# Patient Record
Sex: MALE | Race: WHITE | NOT HISPANIC OR LATINO | Employment: OTHER | URBAN - METROPOLITAN AREA
[De-identification: names, ages, dates, MRNs, and addresses within clinical notes are randomized per-mention and may not be internally consistent; named-entity substitution may affect disease eponyms.]

---

## 2018-10-25 ENCOUNTER — OFFICE VISIT - HEALTHEAST (OUTPATIENT)
Dept: UROLOGY | Facility: CLINIC | Age: 68
End: 2018-10-25

## 2018-10-25 DIAGNOSIS — N20.1 CALCULUS OF URETER: ICD-10-CM

## 2018-10-25 DIAGNOSIS — N13.2 HYDRONEPHROSIS WITH URINARY OBSTRUCTION DUE TO URETERAL CALCULUS: ICD-10-CM

## 2018-10-25 DIAGNOSIS — N20.0 CALCULUS OF KIDNEY: ICD-10-CM

## 2018-10-25 LAB
ALBUMIN UR-MCNC: NEGATIVE MG/DL
APPEARANCE UR: CLEAR
BILIRUB UR QL STRIP: NEGATIVE
COLOR UR AUTO: YELLOW
GLUCOSE UR STRIP-MCNC: NEGATIVE MG/DL
HGB UR QL STRIP: ABNORMAL
KETONES UR STRIP-MCNC: NEGATIVE MG/DL
LEUKOCYTE ESTERASE UR QL STRIP: NEGATIVE
NITRATE UR QL: NEGATIVE
PH UR STRIP: 6 [PH] (ref 5–8)
SP GR UR STRIP: 1.02 (ref 1–1.03)
UROBILINOGEN UR STRIP-ACNC: ABNORMAL

## 2018-10-25 RX ORDER — CELECOXIB 200 MG/1
200 CAPSULE ORAL
Status: SHIPPED | COMMUNITY
Start: 2017-12-11

## 2018-10-25 RX ORDER — ATORVASTATIN CALCIUM 20 MG/1
20 TABLET, FILM COATED ORAL
Status: SHIPPED | COMMUNITY
Start: 2017-12-14 | End: 2021-12-23

## 2018-11-08 ENCOUNTER — OFFICE VISIT - HEALTHEAST (OUTPATIENT)
Dept: UROLOGY | Facility: CLINIC | Age: 68
End: 2018-11-08

## 2018-11-08 ENCOUNTER — HOSPITAL ENCOUNTER (OUTPATIENT)
Dept: CT IMAGING | Facility: CLINIC | Age: 68
Discharge: HOME | End: 2018-11-08
Attending: PHYSICIAN ASSISTANT

## 2018-11-08 DIAGNOSIS — N20.9 URINARY TRACT STONES: ICD-10-CM

## 2018-11-08 DIAGNOSIS — N13.2 HYDRONEPHROSIS WITH URINARY OBSTRUCTION DUE TO URETERAL CALCULUS: ICD-10-CM

## 2018-11-08 DIAGNOSIS — N20.1 CALCULUS OF URETER: ICD-10-CM

## 2018-11-08 DIAGNOSIS — N20.0 CALCULUS OF KIDNEY: ICD-10-CM

## 2018-11-08 LAB
ALBUMIN UR-MCNC: NEGATIVE MG/DL
APPEARANCE UR: CLEAR
BILIRUB UR QL STRIP: NEGATIVE
COLOR UR AUTO: YELLOW
GLUCOSE UR STRIP-MCNC: NEGATIVE MG/DL
HGB UR QL STRIP: NEGATIVE
KETONES UR STRIP-MCNC: NEGATIVE MG/DL
LEUKOCYTE ESTERASE UR QL STRIP: NEGATIVE
NITRATE UR QL: NEGATIVE
PH UR STRIP: 6 [PH] (ref 5–8)
SP GR UR STRIP: 1.01 (ref 1–1.03)
UROBILINOGEN UR STRIP-ACNC: NORMAL

## 2018-11-21 ENCOUNTER — HOSPITAL ENCOUNTER (OUTPATIENT)
Dept: CT IMAGING | Facility: CLINIC | Age: 68
Discharge: HOME | End: 2018-11-21
Attending: PHYSICIAN ASSISTANT

## 2018-11-21 ENCOUNTER — OFFICE VISIT - HEALTHEAST (OUTPATIENT)
Dept: UROLOGY | Facility: CLINIC | Age: 68
End: 2018-11-21

## 2018-11-21 DIAGNOSIS — N20.1 CALCULUS OF URETER: ICD-10-CM

## 2018-11-21 DIAGNOSIS — N20.0 CALCULUS OF KIDNEY: ICD-10-CM

## 2018-11-21 LAB
ALBUMIN UR-MCNC: NEGATIVE MG/DL
APPEARANCE UR: CLEAR
BILIRUB UR QL STRIP: NEGATIVE
COLOR UR AUTO: YELLOW
GLUCOSE UR STRIP-MCNC: NEGATIVE MG/DL
HGB UR QL STRIP: NEGATIVE
KETONES UR STRIP-MCNC: NEGATIVE MG/DL
LEUKOCYTE ESTERASE UR QL STRIP: NEGATIVE
NITRATE UR QL: NEGATIVE
PH UR STRIP: 5 [PH] (ref 5–8)
SP GR UR STRIP: 1.01 (ref 1–1.03)
UROBILINOGEN UR STRIP-ACNC: NORMAL

## 2018-11-21 RX ORDER — ACETAMINOPHEN 500 MG
500 TABLET ORAL EVERY 6 HOURS PRN
Status: SHIPPED | COMMUNITY
Start: 2018-11-21 | End: 2021-11-17

## 2018-12-12 ENCOUNTER — OFFICE VISIT - HEALTHEAST (OUTPATIENT)
Dept: UROLOGY | Facility: CLINIC | Age: 68
End: 2018-12-12

## 2018-12-12 ENCOUNTER — HOSPITAL ENCOUNTER (OUTPATIENT)
Dept: CT IMAGING | Facility: CLINIC | Age: 68
Discharge: HOME | End: 2018-12-12
Attending: UROLOGY

## 2018-12-12 DIAGNOSIS — N20.1 CALCULUS OF URETER: ICD-10-CM

## 2018-12-12 DIAGNOSIS — N20.0 CALCULUS OF KIDNEY: ICD-10-CM

## 2018-12-12 LAB
ALBUMIN UR-MCNC: NEGATIVE MG/DL
APPEARANCE UR: CLEAR
BILIRUB UR QL STRIP: NEGATIVE
COLOR UR AUTO: YELLOW
GLUCOSE UR STRIP-MCNC: NEGATIVE MG/DL
HGB UR QL STRIP: ABNORMAL
KETONES UR STRIP-MCNC: NEGATIVE MG/DL
LEUKOCYTE ESTERASE UR QL STRIP: NEGATIVE
NITRATE UR QL: NEGATIVE
PH UR STRIP: 5.5 [PH] (ref 5–8)
SP GR UR STRIP: >=1.03 (ref 1–1.03)
UROBILINOGEN UR STRIP-ACNC: ABNORMAL

## 2018-12-12 RX ORDER — TAMSULOSIN HYDROCHLORIDE 0.4 MG/1
0.4 CAPSULE ORAL DAILY
Qty: 90 CAPSULE | Refills: 3 | Status: SHIPPED | OUTPATIENT
Start: 2018-12-12

## 2019-01-02 ENCOUNTER — AMBULATORY - HEALTHEAST (OUTPATIENT)
Dept: UROLOGY | Facility: CLINIC | Age: 69
End: 2019-01-02

## 2019-01-03 ENCOUNTER — SURGERY - HEALTHEAST (OUTPATIENT)
Dept: UROLOGY | Facility: CLINIC | Age: 69
End: 2019-01-03

## 2019-01-04 ENCOUNTER — SURGERY - HEALTHEAST (OUTPATIENT)
Dept: SURGERY | Facility: CLINIC | Age: 69
End: 2019-01-04

## 2019-01-04 ENCOUNTER — ANESTHESIA - HEALTHEAST (OUTPATIENT)
Dept: SURGERY | Facility: CLINIC | Age: 69
End: 2019-01-04

## 2019-01-04 ASSESSMENT — MIFFLIN-ST. JEOR: SCORE: 1571.1

## 2019-01-09 ENCOUNTER — AMBULATORY - HEALTHEAST (OUTPATIENT)
Dept: UROLOGY | Facility: CLINIC | Age: 69
End: 2019-01-09

## 2019-01-09 ENCOUNTER — RECORDS - HEALTHEAST (OUTPATIENT)
Dept: ADMINISTRATIVE | Facility: OTHER | Age: 69
End: 2019-01-09

## 2019-01-09 DIAGNOSIS — N20.1 CALCULUS OF URETER: ICD-10-CM

## 2019-01-09 DIAGNOSIS — N20.0 CALCULUS OF KIDNEY: ICD-10-CM

## 2019-01-10 LAB — BACTERIA SPEC CULT: NO GROWTH

## 2019-02-13 ENCOUNTER — OFFICE VISIT - HEALTHEAST (OUTPATIENT)
Dept: UROLOGY | Facility: CLINIC | Age: 69
End: 2019-02-13

## 2019-02-13 ENCOUNTER — HOSPITAL ENCOUNTER (OUTPATIENT)
Dept: CT IMAGING | Facility: CLINIC | Age: 69
Discharge: HOME | End: 2019-02-13
Attending: UROLOGY

## 2019-02-13 DIAGNOSIS — N20.1 CALCULUS OF URETER: ICD-10-CM

## 2019-02-13 LAB
ALBUMIN UR-MCNC: NEGATIVE MG/DL
APPEARANCE UR: CLEAR
BILIRUB UR QL STRIP: NEGATIVE
COLOR UR AUTO: YELLOW
GLUCOSE UR STRIP-MCNC: NEGATIVE MG/DL
HGB UR QL STRIP: NEGATIVE
KETONES UR STRIP-MCNC: ABNORMAL MG/DL
LEUKOCYTE ESTERASE UR QL STRIP: NEGATIVE
NITRATE UR QL: NEGATIVE
PH UR STRIP: 5.5 [PH] (ref 5–8)
SP GR UR STRIP: 1.02 (ref 1–1.03)
UROBILINOGEN UR STRIP-ACNC: ABNORMAL

## 2021-06-02 VITALS — HEIGHT: 70 IN | WEIGHT: 177.44 LBS | BODY MASS INDEX: 25.4 KG/M2

## 2021-06-02 VITALS — WEIGHT: 178 LBS

## 2021-06-21 NOTE — PROGRESS NOTES
Assessment/Plan:        Diagnoses and all orders for this visit:    Calculus of kidney  -     Urinalysis Macroscopic    Calculus of ureter  -     CT Abdomen Pelvis Without Oral Without IV Contrast; Future; Expected date: 12/05/2018    Other orders  -     IBUPROFEN ORAL; Take 200 mg by mouth.  -     acetaminophen (TYLENOL) 500 MG tablet; Take 500 mg by mouth every 6 (six) hours as needed for pain.  -     Symptom Control While Passing a Stone Education  -     Patient Stated Goal: Pass my stone      Stone Management Plan  [facility] 10/25/2018 11/8/2018 11/21/2018   Urinary Tract Infection No suspicion of infection No suspicion of infection No suspicion of infection   Renal Colic Well controlled symptoms Asymptomatic at this time Asymptomatic at this time   Renal Failure No suspicion of renal failure No suspicion of renal failure No suspicion of renal failure   Current CT date 10/23/2018 11/8/2018 11/21/2018   Right sided stones? No No No   R Stone Event No current event No current event No current event   Left sided stones? Yes Yes Yes   L Number of ureteral stones 3 3 3   L GSD of ureteral stones 6 5 5   L Location of ureteral stone Distal Distal Distal   L Number of kidney stones  5 Renal stones unchanged from last exam Renal stones unchanged from last exam   L GSD of kidney stones 2 - 4 2 - 4 2 - 4   L Hydronephrosis Mild Mild Mild   L Stone Event New event Established event Established event   Diagnosis date 10/23/2018 - -   Initial location of primary symptomatic stone Distal - -   Initial GSD of primary symptomatic stone 6 - -   L MET Status Initiation Progression Progression   L Current Plan MET MET MET   MET 2 week F/U 2 week F/U 2 week F/U             Subjective:      HPI  Mr. Maxi Hoffman is a 68 y.o.  male returning to the [facility] [place] for medical expulsive therapy follow up.     On last encounter, his 5 mm stone was in left distal ureter with mild hydronephrosis.  He has had no unanticipated events.    Symptoms have been minimal . He is asymptomatic at present. He denies symptoms of fever, chills, flank pain, nausea, vomiting, urinary frequency and dysuria. Has not seen stone pass.    New CT scan was personally reviewed and demonstrates progression of one of the stones to the bladder, largest 5.5 mm stone remains in the distal ureter with stable mild hydronephrosis.     He will continue to attempt to pass stone and will return in 3 weeks with further imaging. .    If no passage by then will plan for ureteroscopy     ROS   A 12 point comprehensive review of systems is negative except for HPI.    Past Medical History:   Diagnosis Date     Arthritis      High cholesterol      Kidney stone        No past surgical history on file.    Current Outpatient Medications   Medication Sig Dispense Refill     acetaminophen (TYLENOL) 500 MG tablet Take 500 mg by mouth every 6 (six) hours as needed for pain.       atorvastatin (LIPITOR) 20 MG tablet Take 20 mg by mouth.       celecoxib (CELEBREX) 200 MG capsule Take 200 mg by mouth.       IBUPROFEN ORAL Take 200 mg by mouth.       No current facility-administered medications for this visit.        No Known Allergies    Social History     Socioeconomic History     Marital status:      Spouse name: Not on file     Number of children: Not on file     Years of education: Not on file     Highest education level: Not on file   Social Needs     Financial resource strain: Not on file     Food insecurity - worry: Not on file     Food insecurity - inability: Not on file     Transportation needs - medical: Not on file     Transportation needs - non-medical: Not on file   Occupational History     Not on file   Tobacco Use     Smoking status: Never Smoker   Substance and Sexual Activity     Alcohol use: No     Drug use: Not on file     Sexual activity: Not on file   Other Topics Concern     Not on file   Social History Narrative     Not on file        Family History   Problem Relation Age of Onset     Cancer Mother         breast     Diabetes Father      Arthritis Father      Urolithiasis Brother      Objective:      Physical Exam  Vitals:    11/21/18 1127   BP: 159/85   Pulse: 66   Temp: 97.8  F (36.6  C)     General - well developed, well nourished, appropriate for age. Appears no distress at this time  Abdomen - slender soft, non-tender, no hepatosplenomegaly, no masses.   - no flank tenderness, no suprapubic tenderness, kidney and bladder non-palpable  MSK - normal spinal curvature. no spinal tenderness. normal gait. muscular strength intact.  Psych - oriented to time, place, and person, normal mood and affect.      Labs   Urinalysis POC (Office):  Nitrite, UA   Date Value Ref Range Status   11/21/2018 Negative Negative Final   11/08/2018 Negative Negative Final   10/25/2018 Negative Negative Final       Lab Urinalysis:  Blood, UA   Date Value Ref Range Status   11/21/2018 Negative Negative Final   11/08/2018 Negative Negative Final   10/25/2018 Trace (!) Negative Final     Nitrite, UA   Date Value Ref Range Status   11/21/2018 Negative Negative Final   11/08/2018 Negative Negative Final   10/25/2018 Negative Negative Final     Leukocytes, UA   Date Value Ref Range Status   11/21/2018 Negative Negative Final   11/08/2018 Negative Negative Final   10/25/2018 Negative Negative Final     pH, UA   Date Value Ref Range Status   11/21/2018 5.0 5.0 - 8.0 Final   11/08/2018 6.0 5.0 - 8.0 Final   10/25/2018 6.0 5.0 - 8.0 Final

## 2021-06-21 NOTE — PROGRESS NOTES
Assessment/Plan:        Diagnoses and all orders for this visit:    Calculus of ureter  -     Urinalysis Macroscopic  -     Symptom Control While Passing a Stone Education  -     CT Abdomen Pelvis Without Oral Without IV Contrast; Future; Expected date: 11/8/18  -     Patient Stated Goal: Pass my stone    Calculus of kidney    Hydronephrosis with urinary obstruction due to ureteral calculus    Other orders  -     celecoxib (CELEBREX) 200 MG capsule; Take 200 mg by mouth.  -     atorvastatin (LIPITOR) 20 MG tablet; Take 20 mg by mouth.      Stone Management Plan  [facility] 10/25/2018   Urinary Tract Infection No suspicion of infection   Renal Colic Well controlled symptoms   Renal Failure No suspicion of renal failure   Current CT date 10/23/2018   Right sided stones? No   R Stone Event No current event   Left sided stones? Yes   L Number of ureteral stones 3   L GSD of ureteral stones 6   L Location of ureteral stone Distal   L Number of kidney stones  5   L GSD of kidney stones 2 - 4   L Hydronephrosis Mild   L Stone Event New event   Diagnosis date 10/23/2018   Initial location of primary symptomatic stone Distal   Initial GSD of primary symptomatic stone 6   L MET Status Initiation   L Current Plan MET           Subjective:      HPI  Mr. Maxi Hoffman is a 68 y.o.  male presenting to the [facility] [place] following [facility] ED visit for urolithiasis.    He is a remotely recurrent unidentified composition stone former who has not required stone clearance procedures. He has not previously participated in stone risk evaluation. He has no identified modifiable stone risk factors. He has identified non-modifiable stone risks including:  multiple stones at presentation.    Primary symptom at presentation was acute onset left flank pain x several hours. The pain was constant and fluctuant. He found no relief with applying heat to the area. At its worst, pain reached 10/10, improving with  pressure to back. He has remote history of stone event ~ 20 years ago. He has never seen a Urologist. Significant associated symptoms at presentation included:  nausea, vomiting and weakness. Evaluation in ED 10/23 was notable for CT reporting moderate left hydronephrosis with distal ureteral calculi x 2 and ipsilateral tiny renal stones. He was sent home with medications and strainer.    He currently rates 2/10 left flank pain. He has not observed stone (s) to pass. He denies symptoms of fever, chills, nausea, vomiting, urinary frequency and dysuria.     CT scan from 10/23/18 is personally reviewed and demonstrates multiple left distal ureteral stones with mild hydronephrosis (6 mm UVJ, 1 mm and 3 mm adjacent distal). There are ~ 5 left renal stones within upper and mid pole, largest 3 mm. No right sided stones.    Significant labs from presentation include mild hematuria, mild pyuria, negative nitrite, few bacteria and no growth on urine culture.    PLAN    67 yo M with remote history of stone disease, no previous surgical stone interventions. Acute left flank pain with obstructing left distal ureteral stones x 3. Multiple, small non-obstructing left renal stones.    Will proceed with trial of passage. Risks and benefits were detailed of medical expulsive therapy including probability of stone passage, recurrent renal colic, and requirement of emergency medical and/or surgical care and further imaging. He will return in 2 weeks with imaging.    For symptom control, he was prescribed oxycodone and ondansetron. Over the counter symptom control medications of ibuprofen, Dramamine and Tylenol were recommended.    Patient also seen and examined by JOSE L Cobb   Review of Systems  A 12 point comprehensive review of systems is negative except for HPI    Past Medical History:   Diagnosis Date     Arthritis      High cholesterol      Kidney stone        No past surgical history on file.    Current  Outpatient Prescriptions   Medication Sig Dispense Refill     acetaminophen (TYLENOL) 500 MG tablet Take 2 tablets (1,000 mg total) by mouth 4 (four) times a day for 7 days. 56 tablet 0     atorvastatin (LIPITOR) 20 MG tablet Take 20 mg by mouth.       celecoxib (CELEBREX) 200 MG capsule Take 200 mg by mouth.       dimenhyDRINATE (DRAMAMINE) 50 MG tablet Take 1 tablet (50 mg total) by mouth at bedtime for 7 days. 7 tablet 0     ibuprofen (ADVIL,MOTRIN) 200 MG tablet Take 2 tablets (400 mg total) by mouth 4 (four) times a day for 7 days. 56 tablet 0     oxyCODONE (ROXICODONE) 5 MG immediate release tablet Every 4-6 hours as needed if pain is not improved with acetaminophen and ibuprofen. 20 tablet 0     dimenhyDRINATE (DRAMAMINE) 50 MG tablet Take 1 tablet (50 mg total) by mouth 4 (four) times a day as needed. 28 tablet 0     ondansetron (ZOFRAN ODT) 4 MG disintegrating tablet Take 1 tablet (4 mg total) by mouth every 8 (eight) hours as needed for nausea. 10 tablet 0     No current facility-administered medications for this visit.        No Known Allergies    Social History     Social History     Marital status:      Spouse name: N/A     Number of children: N/A     Years of education: N/A     Occupational History     Not on file.     Social History Main Topics     Smoking status: Never Smoker     Smokeless tobacco: Not on file     Alcohol use No     Drug use: Not on file     Sexual activity: Not on file     Other Topics Concern     Not on file     Social History Narrative       Family History   Problem Relation Age of Onset     Cancer Mother      breast     Diabetes Father      Arthritis Father      Urolithiasis Brother        Objective:      Physical Exam  Vitals:    10/25/18 1131   BP: 173/80   Pulse: 60   Temp: 97.7  F (36.5  C)     General - well developed, well nourished, appropriate for age. Appears no distress at this time   Heart - regular rate and rhythm, no murmur  Respiratory - normal effort, clear  to auscultation, good air entry without adventitious noises  Abdomen - slender soft, non-tender, no hepatosplenomegaly, no masses.   - no flank tenderness, no suprapubic tenderness, kidney and bladder non-palpable  MSK - normal spinal curvature. no spinal tenderness. normal gait. muscular strength intact.  Neurology - cranial nerves II-XII grossly intact, normal sensation, no unsteadiness  Skin - intact, no bruising, no gouty tophi  Psych - oriented to time, place, and person, normal mood and affect.    Labs  Urinalysis POC (Office):  Nitrite, UA   Date Value Ref Range Status   10/25/2018 Negative Negative Final   10/23/2018 Negative Negative Final       Lab Urinalysis:  Blood, UA   Date Value Ref Range Status   10/25/2018 Trace (!) Negative Final   10/23/2018 Small (!) Negative Final     Nitrite, UA   Date Value Ref Range Status   10/25/2018 Negative Negative Final   10/23/2018 Negative Negative Final     Leukocytes, UA   Date Value Ref Range Status   10/25/2018 Negative Negative Final   10/23/2018 Large (!) Negative Final     pH, UA   Date Value Ref Range Status   10/25/2018 6.0 5.0 - 8.0 Final   10/23/2018 6.0 4.5 - 8.0 Final    and Acute Labs   Urine Culture    Culture   Date Value Ref Range Status   10/23/2018 No Growth  Final

## 2021-06-21 NOTE — PROGRESS NOTES
Assessment/Plan:        Diagnoses and all orders for this visit:    Calculus of ureter  -     Symptom Control While Passing a Stone Education  -     CT Abdomen Pelvis Without Oral Without IV Contrast; Future; Expected date: 11/22/18  -     Patient Stated Goal: Pass my stone    Urinary tract stones  -     Urinalysis Macroscopic    Calculus of kidney    Hydronephrosis with urinary obstruction due to ureteral calculus      Stone Management Plan  [facility] 10/25/2018 11/8/2018   Urinary Tract Infection No suspicion of infection No suspicion of infection   Renal Colic Well controlled symptoms Asymptomatic at this time   Renal Failure No suspicion of renal failure No suspicion of renal failure   Current CT date 10/23/2018 11/8/2018   Right sided stones? No No   R Stone Event No current event No current event   Left sided stones? Yes Yes   L Number of ureteral stones 3 3   L GSD of ureteral stones 6 5   L Location of ureteral stone Distal Distal   L Number of kidney stones  5 Renal stones unchanged from last exam   L GSD of kidney stones 2 - 4 2 - 4   L Hydronephrosis Mild Mild   L Stone Event New event Established event   Diagnosis date 10/23/2018 -   Initial location of primary symptomatic stone Distal -   Initial GSD of primary symptomatic stone 6 -   L MET Status Initiation Progression   L Current Plan MET MET   MET 2 week F/U 2 week F/U         Subjective:      HPI  Mr. Maxi Hoffman is a 68 y.o.  male returning to the [facility] [place] for medical expulsive therapy follow up.     On last encounter, he had multiple left distal ureteral stones (6, 1 and 3 mm) with mild hydronephrosis. He has had no unanticipated events.    Symptoms have been minimal. He has used ibuprofen and tylenol only which have been helpful. He is asymptomatic at present. He denies symptoms of fever, chills, flank pain, nausea, vomiting, urinary frequency and dysuria.     New CT scan was personally reviewed  and demonstrates progression of stones, now all sitting adjacent to one another within left distal ureter. Mild hydronephrosis. No change to small left renal stones.    PLAN    69 yo M with remote history of stone disease, no previous surgical stone interventions.  Further distal migration of obstructing left distal ureteral stones x 3. Multiple, small non-obstructing left renal stones.    He will continue to attempt to pass stone and will return in 2 weeks with further imaging.     Patient also seen and examined by Irma Jaramillo PA-C       ROS   Review of systems is negative except for HPI.    Past Medical History:   Diagnosis Date     Arthritis      High cholesterol      Kidney stone        History reviewed. No pertinent surgical history.    Current Outpatient Prescriptions   Medication Sig Dispense Refill     atorvastatin (LIPITOR) 20 MG tablet Take 20 mg by mouth.       celecoxib (CELEBREX) 200 MG capsule Take 200 mg by mouth.       No current facility-administered medications for this visit.        No Known Allergies    Social History     Social History     Marital status:      Spouse name: N/A     Number of children: N/A     Years of education: N/A     Occupational History     Not on file.     Social History Main Topics     Smoking status: Never Smoker     Smokeless tobacco: Not on file     Alcohol use No     Drug use: Not on file     Sexual activity: Not on file     Other Topics Concern     Not on file     Social History Narrative       Family History   Problem Relation Age of Onset     Cancer Mother      breast     Diabetes Father      Arthritis Father      Urolithiasis Brother      Objective:      Physical Exam  Vitals:    11/08/18 1148   BP: (!) 198/96   Pulse: 71   Temp: 97.9  F (36.6  C)     General - well developed, well nourished, appropriate for age. Appears no distress at this time  Abdomen - slender soft, non-tender, no hepatosplenomegaly, no masses.   - no flank tenderness, no suprapubic  tenderness, kidney and bladder non-palpable  MSK - normal spinal curvature. no spinal tenderness. normal gait. muscular strength intact.  Psych - oriented to time, place, and person, normal mood and affect.    Labs   Urinalysis POC (Office):  Nitrite, UA   Date Value Ref Range Status   11/08/2018 Negative Negative Final   10/25/2018 Negative Negative Final   10/23/2018 Negative Negative Final       Lab Urinalysis:  Blood, UA   Date Value Ref Range Status   11/08/2018 Negative Negative Final   10/25/2018 Trace (!) Negative Final   10/23/2018 Small (!) Negative Final     Nitrite, UA   Date Value Ref Range Status   11/08/2018 Negative Negative Final   10/25/2018 Negative Negative Final   10/23/2018 Negative Negative Final     Leukocytes, UA   Date Value Ref Range Status   11/08/2018 Negative Negative Final   10/25/2018 Negative Negative Final   10/23/2018 Large (!) Negative Final     pH, UA   Date Value Ref Range Status   11/08/2018 6.0 5.0 - 8.0 Final   10/25/2018 6.0 5.0 - 8.0 Final   10/23/2018 6.0 4.5 - 8.0 Final

## 2021-06-22 NOTE — ANESTHESIA CARE TRANSFER NOTE
Last vitals:   Vitals:    01/04/19 0958   BP: 124/70   Pulse: 81   Resp: 16   Temp: 36.3  C (97.4  F)   SpO2: 100%     Patient's level of consciousness is drowsy  Spontaneous respirations: yes  Maintains airway independently: yes  Dentition unchanged: yes  Oropharynx: oropharynx clear of all foreign objects    QCDR Measures:  ASA# 20 - Surgical Safety Checklist: WHO surgical safety checklist completed prior to induction    PQRS# 430 - Adult PONV Prevention: 4558F - Pt received => 2 anti-emetic agents (different classes) preop & intraop  ASA# 8 - Peds PONV Prevention: NA - Not pediatric patient, not GA or 2 or more risk factors NOT present  PQRS# 424 - Kelly-op Temp Management: 4559F - At least one body temp DOCUMENTED => 35.5C or 95.9F within required timeframe  PQRS# 426 - PACU Transfer Protocol:NA - Patient did not go to PACU  ASA# 14 - Acute Post-op Pain: NA - Patient under age 10y or did not go to PACU

## 2021-06-22 NOTE — ANESTHESIA POSTPROCEDURE EVALUATION
Patient: Maxi Hoffman  CYSTOSCOPY, LEFT URETEROSCOPY LASER LITHOTRIPSY STENT INSERTION  Anesthesia type: general    Patient location: PACU  Last vitals:   Vitals:    01/04/19 1015   BP: 117/65   Pulse: 76   Resp: 17   Temp:    SpO2: 96%     Post vital signs: stable  Level of consciousness: awake and responds to simple questions  Post-anesthesia pain: pain controlled  Post-anesthesia nausea and vomiting: no  Pulmonary: unassisted, return to baseline  Cardiovascular: stable and blood pressure at baseline  Hydration: adequate  Anesthetic events: no    QCDR Measures:  ASA# 11 - Kelly-op Cardiac Arrest: ASA11B - Patient did NOT experience unanticipated cardiac arrest  ASA# 12 - Kelly-op Mortality Rate: ASA12B - Patient did NOT die  ASA# 13 - PACU Re-Intubation Rate: ASA13B - Patient did NOT require a new airway mgmt  ASA# 10 - Composite Anes Safety: ASA10A - No serious adverse event    Additional Notes:

## 2021-06-22 NOTE — PROGRESS NOTES
Assessment/Plan:        Diagnoses and all orders for this visit:    Calculus of kidney  -     Urinalysis Macroscopic  -     XR Abdomen AP; Future; Expected date: 12/12/2018  -     tamsulosin (FLOMAX) 0.4 mg cap; Take 1 capsule (0.4 mg total) by mouth daily.  Dispense: 90 capsule; Refill: 3  -     Stone Analysis- Standing; Standing    Calculus of ureter  -     Symptom Control While Passing a Stone Education  -     Patient Stated Goal: Pass my stone      Stone Management Plan  [facility] 11/8/2018 11/21/2018 12/17/2018   Urinary Tract Infection No suspicion of infection No suspicion of infection No suspicion of infection   Renal Colic Asymptomatic at this time Asymptomatic at this time Asymptomatic at this time   Renal Failure No suspicion of renal failure No suspicion of renal failure No suspicion of renal failure   Current CT date 11/8/2018 11/21/2018 12/12/2018   Right sided stones? No No No   R Stone Event No current event No current event No current event   Left sided stones? Yes Yes Yes   L Number of ureteral stones 3 2 1   L GSD of ureteral stones 5 5 5   L Location of ureteral stone Distal Distal Distal   L Number of kidney stones  Renal stones unchanged from last exam Renal stones unchanged from last exam Renal stones unchanged from last exam   L GSD of kidney stones 2 - 4 2 - 4 2 - 4   L Hydronephrosis Mild Mild Mild   L Stone Event Established event Established event Established event   Diagnosis date - - -   Initial location of primary symptomatic stone - - -   Initial GSD of primary symptomatic stone - - -   L MET Status Progression Progression Progression   L Current Plan MET MET MET   MET 2 week F/U 2 week F/U (No Data)             Subjective:      HPI  Mr. Maxi Hoffman is a 68 y.o.  male returning to the [facility] [place] for medical expulsive therapy follow up.     On last encounter, his 5 mm stone was in left distal ureter with mild hydronephrosis. He has had no  unanticipated events.    Symptoms have been well controlled with medication and he is able to carry on normal activities. Significant current symptoms include:  left flank pain. Pertinent negative current symptoms include:  fever, chills, right flank pain, nausea, vomiting, hematuria, urinary frequency and dysuria. Had one episode of discomofrt otherwise comfortable and unaware stone is still present.  Was started on flomax as he had BPH symptoms as well and has noticed improvement in LUTS.    New CT scan was personally reviewed and demonstrates progression of stone to extreme left UVJ with stable mild hydronephrosis.     He will continue to attempt to pass stone and will return in 4 weeks weeks with further imaging. .       ROS   A 12 point comprehensive review of systems is negative except for HPI.    Past Medical History:   Diagnosis Date     Arthritis      High cholesterol      Kidney stone        History reviewed. No pertinent surgical history.    Current Outpatient Medications   Medication Sig Dispense Refill     acetaminophen (TYLENOL) 500 MG tablet Take 500 mg by mouth every 6 (six) hours as needed for pain.       atorvastatin (LIPITOR) 20 MG tablet Take 20 mg by mouth.       celecoxib (CELEBREX) 200 MG capsule Take 200 mg by mouth.       IBUPROFEN ORAL Take 200 mg by mouth.       tamsulosin (FLOMAX) 0.4 mg cap Take 1 capsule (0.4 mg total) by mouth daily. 90 capsule 3     No current facility-administered medications for this visit.        No Known Allergies    Social History     Socioeconomic History     Marital status:      Spouse name: Not on file     Number of children: Not on file     Years of education: Not on file     Highest education level: Not on file   Social Needs     Financial resource strain: Not on file     Food insecurity - worry: Not on file     Food insecurity - inability: Not on file     Transportation needs - medical: Not on file     Transportation needs - non-medical: Not on file    Occupational History     Not on file   Tobacco Use     Smoking status: Never Smoker     Smokeless tobacco: Never Used   Substance and Sexual Activity     Alcohol use: No     Drug use: Not on file     Sexual activity: Not on file   Other Topics Concern     Not on file   Social History Narrative     Not on file       Family History   Problem Relation Age of Onset     Cancer Mother         breast     Diabetes Father      Arthritis Father      Urolithiasis Brother      Objective:      Physical Exam  Vitals:    12/12/18 1035   BP: 167/82   Pulse: 72   Temp: 97.6  F (36.4  C)     General - well developed, well nourished, appropriate for age. Appears no distress at this time  Abdomen - mildly obese soft, non-tender, no hepatosplenomegaly, no masses.   - no flank tenderness, no suprapubic tenderness, kidney and bladder non-palpable  MSK - normal spinal curvature. no spinal tenderness. normal gait. muscular strength intact.  Psych - oriented to time, place, and person, normal mood and affect.      Labs   Urinalysis POC (Office):  Nitrite, UA   Date Value Ref Range Status   12/12/2018 Negative Negative Final   11/21/2018 Negative Negative Final   11/08/2018 Negative Negative Final       Lab Urinalysis:  Blood, UA   Date Value Ref Range Status   12/12/2018 Small (!) Negative Final   11/21/2018 Negative Negative Final   11/08/2018 Negative Negative Final     Nitrite, UA   Date Value Ref Range Status   12/12/2018 Negative Negative Final   11/21/2018 Negative Negative Final   11/08/2018 Negative Negative Final     Leukocytes, UA   Date Value Ref Range Status   12/12/2018 Negative Negative Final   11/21/2018 Negative Negative Final   11/08/2018 Negative Negative Final     pH, UA   Date Value Ref Range Status   12/12/2018 5.5 5.0 - 8.0 Final   11/21/2018 5.0 5.0 - 8.0 Final   11/08/2018 6.0 5.0 - 8.0 Final

## 2021-06-22 NOTE — ANESTHESIA PREPROCEDURE EVALUATION
Anesthesia Evaluation      Patient summary reviewed   History of anesthetic complications     Airway   Mallampati: II  Neck ROM: full   Pulmonary - normal exam                          Cardiovascular - normal exam   Neuro/Psych      Endo/Other       GI/Hepatic/Renal    (+)   chronic renal disease,           Dental - normal exam                        Anesthesia Plan  Planned anesthetic: general LMA    ASA 1   Induction: intravenous   Anesthetic plan and risks discussed with: patient  Anesthesia plan special considerations: antiemetics,   Post-op plan: routine recovery

## 2021-06-22 NOTE — PROGRESS NOTES
Patient present sot office today for cystoscopy left ureteral stent removal procedure.    Patient educated regarding stent removal procedure and possible symptoms after removal.  Patient voiced understanding of information.  Handout given to patient.  Consent form signed.    [facility] Timeout    Correct patient?: Yes  Correct site?:  Yes  Correct procedure?:  Yes  Correct laterality?:  Left  Consents verified?:  Yes  Relevant lab results available?:  Yes

## 2021-06-22 NOTE — PATIENT INSTRUCTIONS - HE
Patient Stated Goal: Prevent further stones  Cystoscopy with Stent Removal    Cystoscopy is used to help diagnose urinary problems, or to remove a ureteral stent.    During a cystoscopy, your doctor examines the inside of your bladder with an instrument called a cystoscope. A cystoscope is a long, thin flexible tube with a camera at the end.    Your doctor will insert the scope into your urethra allowing him to visualize and evaluate the inside of the bladder for possible abnormalities. The urethra is the tube that carries urine to the outside of your body.    How is the stent removed?    Your stent will be removed in the [facility] with a small telescope and a grasping tool.  It usually takes less than 1 minute to remove the stent.    What should I expect after the stent is removed?     You should feel normal by the next day.    Some patients find:      An increase in back pain about an hour after the stent is removed as the kidney fills up with urine before it starts to empty.  It can be as uncomfortable as your initial stone episode.  Taking pain medications before stent removal may be helpful, but you would need someone else to drive you to and from your appointment.    Bladder symptoms usually disappear by the next morning.    Small amounts of blood in the urine may be seen occasionally for up to a week.    At Home:      It is important to drink plenty of fluids after your procedure    You may continue to use your pain medications as prescribed    What symptoms should I watch for?    Fever     Chills    Increasing back pain that is not relieved with pain medications    Large amounts of blood in the urine or large clots    Leakage of urine (incontinence)     Are not able to urinate for 8 hours    These symptoms may mean you have a blockage or infection. Call the [facility] 24 hours a day at [phone] immediately.

## 2021-06-23 NOTE — PROGRESS NOTES
Assessment/Plan:        Diagnoses and all orders for this visit:    Calculus of ureter  -     Cancel: Urinalysis Macroscopic  -     Culture, Urine- Future; Future; Expected date: 02/08/2019  -     Culture, Urine- Future  -     lidocaine HCl 2 % topical jelly 10 mL (UROJET); Insert 10 mL into the urethra once.        -     cephalexin capsule 250 mg (KEFLEX); Take 1 capsule (250 mg total) by mouth once.        -     Cystoscopy with Stent Removal Education  -     Cancel: CT Abdomen Pelvis Without Oral Without IV Contrast; Future; Expected date: 02/08/2019  -     Patient Stated Goal: Prevent further stones  -     CT Abdomen Pelvis Without Oral Without IV Contrast; Future; Expected date: 01/09/2019    Calculus of kidney  -     lidocaine HCl 2 % topical jelly 10 mL (UROJET); Insert 10 mL into the urethra once.        -     cephalexin capsule 250 mg (KEFLEX); Take 1 capsule (250 mg total) by mouth once.          Other orders  -     cephalexin (KEFLEX) 250 MG capsule;   -     lidocaine HCl (UROJET) 2 % topical jelly;       Stone Management Plan  [facility] Stone [facility] 11/8/2018 11/21/2018 12/17/2018   Urinary Tract Infection No suspicion of infection No suspicion of infection No suspicion of infection   Renal Colic Asymptomatic at this time Asymptomatic at this time Asymptomatic at this time   Renal Failure No suspicion of renal failure No suspicion of renal failure No suspicion of renal failure   Current CT date 11/8/2018 11/21/2018 12/12/2018   Right sided stones? No No No   R Stone Event No current event No current event No current event   Left sided stones? Yes Yes Yes   L Number of ureteral stones 3 2 1   L GSD of ureteral stones 5 5 5   L Location of ureteral stone Distal Distal Distal   L Number of kidney stones  Renal stones unchanged from last exam Renal stones unchanged from last exam Renal stones unchanged from last exam   L GSD of kidney stones 2 - 4 2 - 4 2 - 4   L Hydronephrosis Mild Mild Mild   L Stone Event  Established event Established event Established event   Diagnosis date - - -   Initial location of primary symptomatic stone - - -   Initial GSD of primary symptomatic stone - - -   L MET Status Progression Progression Progression   L Current Plan MET MET MET   MET 2 week F/U 2 week F/U (No Data)             Subjective:      HPI  Mr. Maxi Hoffman is a 68 y.o.  male returning to the [facility] for early postoperative follow up for anticipated stent removal.     He returns status post left ureteroscopic laser lithotripsy for renal and ureteral stone. He has had no unanticipated events.    He has had no symptoms suspicious for infection and stent was moderately symptomatic with typical issues of flank discomfort and lower urinary tract irritation.     Flexible cystoscopy is performed and indwelling stent is removed without incident.    He will follow up in the office in one month with imaging.         ROS   A 12 point comprehensive review of systems is negative except for HPI.    Past Medical History:   Diagnosis Date     Arthritis      High cholesterol      Kidney stone        Past Surgical History:   Procedure Laterality Date     CYSTOSCOPY W/ URETERAL STENT PLACEMENT Left 1/4/2019    Procedure: CYSTOSCOPY, LEFT URETEROSCOPY LASER LITHOTRIPSY STENT INSERTION;  Surgeon: Daniel Dean MD;  Location: [facility];  Service: Urology     never had surgery         Current Outpatient Medications   Medication Sig Dispense Refill     acetaminophen (TYLENOL) 500 MG tablet Take 500 mg by mouth every 6 (six) hours as needed for pain.       atorvastatin (LIPITOR) 20 MG tablet Take 20 mg by mouth.       celecoxib (CELEBREX) 200 MG capsule Take 200 mg by mouth.       IBUPROFEN ORAL Take 200 mg by mouth as needed.              oxyCODONE-acetaminophen (PERCOCET/ENDOCET) 5-325 mg per tablet Take 1 tablet by mouth every 4 (four) hours as needed for pain.       tamsulosin (FLOMAX) 0.4  mg cap Take 1 capsule (0.4 mg total) by mouth daily. 90 capsule 3     Current Facility-Administered Medications   Medication Dose Route Frequency Provider Last Rate Last Dose     cephalexin (KEFLEX) 250 MG capsule              lidocaine HCl (UROJET) 2 % topical jelly                Allergies   Allergen Reactions     Levaquin [Levofloxacin] Itching       Social History     Socioeconomic History     Marital status:      Spouse name: Not on file     Number of children: Not on file     Years of education: Not on file     Highest education level: Not on file   Social Needs     Financial resource strain: Not on file     Food insecurity - worry: Not on file     Food insecurity - inability: Not on file     Transportation needs - medical: Not on file     Transportation needs - non-medical: Not on file   Occupational History     Not on file   Tobacco Use     Smoking status: Never Smoker     Smokeless tobacco: Never Used   Substance and Sexual Activity     Alcohol use: No     Drug use: No     Sexual activity: Not on file   Other Topics Concern     Not on file   Social History Narrative     Not on file       Family History   Problem Relation Age of Onset     Cancer Mother         breast     Diabetes Father      Arthritis Father      Urolithiasis Brother      Objective:      Physical Exam  Vitals:    01/09/19 1302   BP: 166/83   Pulse: 81   Temp: 98  F (36.7  C)     General - well developed, well nourished, appropriate for age. Appears no distress at this time  Abdomen - slender soft, non-tender, no hepatosplenomegaly, no masses.   - no flank tenderness, no suprapubic tenderness, kidney and bladder non-palpable  MSK - normal spinal curvature. no spinal tenderness. normal gait. muscular strength intact.  Psych - oriented to time, place, and person, normal mood and affect.      Labs   Urinalysis POC (Office):  Nitrite, UA   Date Value Ref Range Status   12/12/2018 Negative Negative Final   11/21/2018 Negative Negative  Final   11/08/2018 Negative Negative Final       Lab Urinalysis:  Blood, UA   Date Value Ref Range Status   12/12/2018 Small (!) Negative Final   11/21/2018 Negative Negative Final   11/08/2018 Negative Negative Final     Nitrite, UA   Date Value Ref Range Status   12/12/2018 Negative Negative Final   11/21/2018 Negative Negative Final   11/08/2018 Negative Negative Final     Leukocytes, UA   Date Value Ref Range Status   12/12/2018 Negative Negative Final   11/21/2018 Negative Negative Final   11/08/2018 Negative Negative Final     pH, UA   Date Value Ref Range Status   12/12/2018 5.5 5.0 - 8.0 Final   11/21/2018 5.0 5.0 - 8.0 Final   11/08/2018 6.0 5.0 - 8.0 Final

## 2021-06-24 NOTE — PROGRESS NOTES
Assessment/Plan:        Diagnoses and all orders for this visit:    Calculus of ureter  -     Urinalysis Macroscopic      Stone Management Plan  [facility] Stone [facility] 12/17/2018 1/10/2019 2/16/2019   Urinary Tract Infection No suspicion of infection No suspicion of infection No suspicion of infection   Renal Colic Asymptomatic at this time Asymptomatic at this time Asymptomatic at this time   Renal Failure No suspicion of renal failure No suspicion of renal failure No suspicion of renal failure   Current CT date 12/12/2018 - 2/13/2019   Right sided stones? No - No   R Stone Event No current event No current event No current event   Left sided stones? Yes - No   L Number of ureteral stones 1 - -   L GSD of ureteral stones 5 - -   L Location of ureteral stone Distal - -   L Number of kidney stones  Renal stones unchanged from last exam - -   L GSD of kidney stones 2 - 4 - -   L Hydronephrosis Mild - -   L Stone Event Established event Established event Resolved event   Diagnosis date - - -   Initial location of primary symptomatic stone - - -   Initial GSD of primary symptomatic stone - - -   Post-op status - Stent Removal -   L MET Status Progression - -   L Current Plan MET - -   MET (No Data) - -             Subjective:      HPI  Mr. Maxi Hoffman is a 68 y.o.  male returning to the [facility] [place] for late postoperative follow-up.     He returns status post Left ureteroscopic laser lithotripsy for renal and ureteral stone. He has had no unanticipated events.     He is asymptomatic at present. He denies symptoms of fever, chills, flank pain, nausea, vomiting, urinary frequency and dysuria.    New CT scan was personally reviewed and demonstrates complete clearance of targeted stone  with no hydronephrosis.     Stone composition was 100% calcium oxalate.     He is a low risk first time stone former and was educated on conservative strategies for risk reduction    Will also continue flomax  for BPH given symptomatic relief     ROS   A 12 point comprehensive review of systems is negative except for HPI.    Past Medical History:   Diagnosis Date     Arthritis      High cholesterol      Kidney stone        Past Surgical History:   Procedure Laterality Date     CYSTOSCOPY W/ URETERAL STENT PLACEMENT Left 1/4/2019    Procedure: CYSTOSCOPY, LEFT URETEROSCOPY LASER LITHOTRIPSY STENT INSERTION;  Surgeon: aDniel Dean MD;  Location: [facility];  Service: Urology     never had surgery         Current Outpatient Medications   Medication Sig Dispense Refill     acetaminophen (TYLENOL) 500 MG tablet Take 500 mg by mouth every 6 (six) hours as needed for pain.       celecoxib (CELEBREX) 200 MG capsule Take 200 mg by mouth.       IBUPROFEN ORAL Take 200 mg by mouth as needed.              tamsulosin (FLOMAX) 0.4 mg cap Take 1 capsule (0.4 mg total) by mouth daily. 90 capsule 3     atorvastatin (LIPITOR) 20 MG tablet Take 20 mg by mouth.       No current facility-administered medications for this visit.        Allergies   Allergen Reactions     Levaquin [Levofloxacin] Itching       Social History     Socioeconomic History     Marital status:      Spouse name: Not on file     Number of children: Not on file     Years of education: Not on file     Highest education level: Not on file   Social Needs     Financial resource strain: Not on file     Food insecurity - worry: Not on file     Food insecurity - inability: Not on file     Transportation needs - medical: Not on file     Transportation needs - non-medical: Not on file   Occupational History     Not on file   Tobacco Use     Smoking status: Never Smoker     Smokeless tobacco: Never Used   Substance and Sexual Activity     Alcohol use: No     Drug use: No     Sexual activity: Not on file   Other Topics Concern     Not on file   Social History Narrative     Not on file       Family History   Problem Relation Age of Onset     Cancer Mother          breast     Diabetes Father      Arthritis Father      Urolithiasis Brother      Objective:      Physical Exam  Vitals:    02/13/19 1047   BP: 175/81   Pulse: 62   Temp: 97.5  F (36.4  C)     General - well developed, well nourished, appropriate for age. Appears no distress at this time  Abdomen - mildly obese soft, non-tender, no hepatosplenomegaly, no masses.   - no flank tenderness, no suprapubic tenderness, kidney and bladder non-palpable  MSK - normal spinal curvature. no spinal tenderness. normal gait. muscular strength intact.  Psych - oriented to time, place, and person, normal mood and affect.      Labs   Urinalysis POC (Office):  Nitrite, UA   Date Value Ref Range Status   02/13/2019 Negative Negative Final   12/12/2018 Negative Negative Final   11/21/2018 Negative Negative Final       Lab Urinalysis:  Blood, UA   Date Value Ref Range Status   02/13/2019 Negative Negative Final   12/12/2018 Small (!) Negative Final   11/21/2018 Negative Negative Final     Nitrite, UA   Date Value Ref Range Status   02/13/2019 Negative Negative Final   12/12/2018 Negative Negative Final   11/21/2018 Negative Negative Final     Leukocytes, UA   Date Value Ref Range Status   02/13/2019 Negative Negative Final   12/12/2018 Negative Negative Final   11/21/2018 Negative Negative Final     pH, UA   Date Value Ref Range Status   02/13/2019 5.5 5.0 - 8.0 Final   12/12/2018 5.5 5.0 - 8.0 Final   11/21/2018 5.0 5.0 - 8.0 Final

## 2021-07-03 NOTE — ADDENDUM NOTE
Addendum Note by Brien Borrero CRNA at 1/4/2019 10:58 AM     Author: Brien Borrero CRNA Service: -- Author Type: Nurse Anesthetist    Filed: 1/4/2019 10:58 AM Date of Service: 1/4/2019 10:58 AM Status: Signed    : Brien Borrero CRNA (Nurse Anesthetist)       Addendum  created 01/04/19 1058 by Brien Borrero CRNA    Intraprocedure Meds edited, Orders acknowledged in Narrator

## 2021-07-19 ENCOUNTER — HOSPITAL ENCOUNTER (EMERGENCY)
Dept: CT IMAGING | Facility: CLINIC | Age: 71
End: 2021-07-19
Payer: COMMERCIAL

## 2021-07-19 ENCOUNTER — HOSPITAL ENCOUNTER (EMERGENCY)
Facility: CLINIC | Age: 71
Discharge: HOME | End: 2021-07-19
Admitting: PHYSICIAN ASSISTANT
Payer: COMMERCIAL

## 2021-07-19 VITALS
OXYGEN SATURATION: 99 % | WEIGHT: 170 LBS | BODY MASS INDEX: 23.8 KG/M2 | DIASTOLIC BLOOD PRESSURE: 90 MMHG | TEMPERATURE: 97.3 F | RESPIRATION RATE: 16 BRPM | HEART RATE: 58 BPM | HEIGHT: 71 IN | SYSTOLIC BLOOD PRESSURE: 218 MMHG

## 2021-07-19 DIAGNOSIS — M48.061 NEUROFORAMINAL STENOSIS OF LUMBAR SPINE: ICD-10-CM

## 2021-07-19 DIAGNOSIS — M62.830 LUMBAR PARASPINAL MUSCLE SPASM: ICD-10-CM

## 2021-07-19 LAB
ALBUMIN UR-MCNC: NEGATIVE MG/DL
APPEARANCE UR: CLEAR
BACTERIA #/AREA URNS HPF: ABNORMAL /HPF
BILIRUB UR QL STRIP: NEGATIVE
COLOR UR AUTO: ABNORMAL
GLUCOSE UR STRIP-MCNC: NEGATIVE MG/DL
HGB UR QL STRIP: NEGATIVE
KETONES UR STRIP-MCNC: NEGATIVE MG/DL
LEUKOCYTE ESTERASE UR QL STRIP: NEGATIVE
MUCOUS THREADS #/AREA URNS LPF: PRESENT /LPF
NITRATE UR QL: NEGATIVE
PH UR STRIP: 7 [PH] (ref 5–7)
RBC URINE: 1 /HPF
SP GR UR STRIP: 1.02 (ref 1–1.03)
UROBILINOGEN UR STRIP-MCNC: <2 MG/DL
WBC URINE: 1 /HPF

## 2021-07-19 PROCEDURE — 99284 EMERGENCY DEPT VISIT MOD MDM: CPT | Mod: 25

## 2021-07-19 PROCEDURE — 72131 CT LUMBAR SPINE W/O DYE: CPT

## 2021-07-19 PROCEDURE — 81001 URINALYSIS AUTO W/SCOPE: CPT | Performed by: PHYSICIAN ASSISTANT

## 2021-07-19 PROCEDURE — 250N000013 HC RX MED GY IP 250 OP 250 PS 637: Performed by: PHYSICIAN ASSISTANT

## 2021-07-19 RX ORDER — CYCLOBENZAPRINE HCL 10 MG
10 TABLET ORAL ONCE
Status: COMPLETED | OUTPATIENT
Start: 2021-07-19 | End: 2021-07-19

## 2021-07-19 RX ORDER — OXYCODONE AND ACETAMINOPHEN 5; 325 MG/1; MG/1
1 TABLET ORAL ONCE
Status: COMPLETED | OUTPATIENT
Start: 2021-07-19 | End: 2021-07-19

## 2021-07-19 RX ORDER — METHYLPREDNISOLONE 4 MG
TABLET, DOSE PACK ORAL
Qty: 21 TABLET | Refills: 0 | Status: SHIPPED | OUTPATIENT
Start: 2021-07-19 | End: 2021-12-02

## 2021-07-19 RX ORDER — OXYCODONE HYDROCHLORIDE 5 MG/1
5 TABLET ORAL EVERY 6 HOURS PRN
Qty: 5 TABLET | Refills: 0 | Status: SHIPPED | OUTPATIENT
Start: 2021-07-19 | End: 2021-11-17

## 2021-07-19 RX ORDER — CYCLOBENZAPRINE HCL 10 MG
10 TABLET ORAL 3 TIMES DAILY PRN
Qty: 20 TABLET | Refills: 0 | Status: SHIPPED | OUTPATIENT
Start: 2021-07-19 | End: 2021-12-02

## 2021-07-19 RX ADMIN — CYCLOBENZAPRINE HYDROCHLORIDE 10 MG: 10 TABLET, FILM COATED ORAL at 09:42

## 2021-07-19 RX ADMIN — OXYCODONE HYDROCHLORIDE AND ACETAMINOPHEN 1 TABLET: 5; 325 TABLET ORAL at 09:42

## 2021-07-19 ASSESSMENT — ENCOUNTER SYMPTOMS
ARTHRALGIAS: 1
FEVER: 0
CHILLS: 0
WEAKNESS: 0
BACK PAIN: 1
NUMBNESS: 0
DIFFICULTY URINATING: 0

## 2021-07-19 ASSESSMENT — MIFFLIN-ST. JEOR: SCORE: 1553.24

## 2021-07-19 NOTE — ED NOTES
Discussed elevated bp readings of today's visit and encouraged patient to follow up with primary MD. LAYLA Chiang informed.

## 2021-07-19 NOTE — ED TRIAGE NOTES
Unable to get self out of bed this am.    Has to crawl to restroom.  Arrived to department by daughter.    [place] on Saturday, which is a normal activity 3-4 times a week.    Has been having left shoulder discomfort for 3 weeks.  Will have trouble finding comfortable position in bed, but otherwise went to bed feeling fine last night.      Did take 400 mg of ibuprofen at 0700

## 2021-07-19 NOTE — DISCHARGE INSTRUCTIONS
You were seen in the emergency department for your back pain.    I believe your pain is likely due to muscle spasm. 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     Use ibuprofen 600 mg and tylenol 650 mg every 6-8 hours for pain. Always take ibuprofen with food to preventstomach upset.  Use flexeril 10 mg every 8 hours as needed for pain and muscle spasms. Flexeril is a muscle relaxer, and can make you sleepy. No drinking, driving, or working while taking this medication.  Oxycodone 5 mg every 8 hours as needed for severe pain. Take medications as prescribed.  Be aware they can make you sleepy or drowsy so do not drive while taking, do other dangerous activities, or mix with other sedatives or alcohol.  You should also know that these medicationscan be addictive.  Medrol dose pack: follow package instructions  You may also use Lidoderm patches for pain. Apply in the area of most pain in the morning, wear for 12 hours, andtake off for 12 hours.These are available over the counter at [employer] ([employer]percre[employer] 4%).     I also recommend warm compresses/heat packs and gentlemassage for pain.    Try to stay active, but do not over-doit.    [facility]-7 days to make sure your symptoms are improving.    Be sure to immediately return to the emergency department if you experience weakness in one or both legs, incontinence of the bowels or bladder, fevers, night sweats,or any other new/concerning symptoms.

## 2021-07-19 NOTE — ED PROVIDER NOTES
EMERGENCY DEPARTMENT ENCOUNTER      NAME: Maxi Hoffman  AGE: 71 year old male  YOB: 1950  MRN: [id number]  EVALUATION DATE & TIME: 7/19/2021  9:00 AM    PCP: Storm Cobos    ED PROVIDER: Mariia Boothe PA-C      Chief Complaint   Patient presents with     Back Pain         FINAL IMPRESSION:  1. Neuroforaminal stenosis of lumbar spine    2. Lumbar paraspinal muscle spasm          MEDICAL DECISION MAKING:    Pertinent Labs & Imaging studies reviewed. (See chart for details)  71 year old male with history of lumbar radiculopathy presents to the Emergency Department for evaluation of low back pain. Woke up early this morning with tightness in his low back. When he woke up a few hours later pain was severe and he was unable to stand up straight and had to crawl to bathroom. Denies any pain, tingling or numbness radiating into legs, saddle anesthesia, bowel/bladder dysfunction, or fevers.     Exam reveals well-appearing male in no obvious distress.  Vital signs notable for elevated blood pressure. Bilateral paraspinal lumbar tenderness and point tenderness to left lateral low back musculature and muscle is contracted. Lumbar flexion and extension slightly diminished. Straight leg raise positive on left. No midline spinal tenderness. No step off deformities. No overlying skin changes. 5/5 strength of bilateral lower extremities and sensation intact. Differential diagnosis includes muscle spasm/strain, herniated disc, cauda equina syndrome, vertebral fracture, vertebral tumor, epidural abscess / discitis.     Acute, severe low back pain. Overall, the patient has a reassuring physical exam with no focal neuro deficits. He denies any saddle anesthesia, bowel or bladder incontinence, history of immunocompromise, fever or night sweats, spinal instrumentation, or other red flags. He has no bony tenderness or recent falls or trauma. Clinically, nothing to suggest spinal cord compromise, epidural bleed or  abscess and thus do not feel emergent MRI is indicated. Given the sudden onset and inability to stand up straight prior to arrival we did proceed with CT lumbar spine. He has combination of degenerative interspace narrowing and shallow disc osteophyte complex and listhesis at L5-S1 that results in severe right and moderate left foraminal stenosis. No significant spinal canal narrowing. After flexeril and oxycodone in ED, he is now able to stand straight and ambulated to restroom without difficulty noting significant improvement. Treatment with medrol dose pack, flexeril, tylenol, ibuprofen and a few oxycodone. Referral for spine center placed and patient knows to follow up. Return precautions provided. All questions were answered and reasons to return were again discussed. Patient felt comfortable with this plan and the patient was discharged in stable condition.    Patient's blood pressure was noted to be elevated at time of ED visit. No signs of endorgan damage noted. Patient denies any headache, vision changes, nausea, vomiting, chest pain, or shortness of breath.  Explained the need to get his blood pressure rechecked soon and tofollow-up with a primary care clinic to determine if additional treatment is needed. Explained the risks of having uncontrolled high blood pressure, including the risks of end organ damage and potentially failure of the organs or even death. Patient expressed understanding that they had a elevated blood pressure reading and it was their responsibily to follow-up on their elevated blood pressure appropriately.    0 minutes of critical care time     ED COURSE  9:15 AM  I met with the patient, obtained history, performed an initial exam, and discussed options and plan for diagnostics and treatment here in the ED. PPE: Provider wore surgical mask and gloves  11:01 AM   I rechecked and updated the patient. We discussed plans for discharge including supportive cares, symptomatic treatment,  outpatient follow up, and reasons to return to the emergency department.     At the conclusion of the encounter I discussed the results of all of the tests and the disposition. The questions were answered. The patient and family acknowledged understanding and were agreeable with the care plan.     MEDICATIONS GIVEN IN THE EMERGENCY:  Medications   oxyCODONE-acetaminophen (PERCOCET) 5-325 MG per tablet 1 tablet (1 tablet Oral Given 7/19/21 0942)   cyclobenzaprine (FLEXERIL) tablet 10 mg (10 mg Oral Given 7/19/21 0942)       NEW PRESCRIPTIONS STARTED AT TODAY'S ER VISIT  Discharge Medication List as of 7/19/2021 11:10 AM      START taking these medications    Details   cyclobenzaprine (FLEXERIL) 10 MG tablet Take 1 tablet (10 mg) by mouth 3 times daily as needed for muscle spasms, Disp-20 tablet, R-0, E-Prescribe      methylPREDNISolone (MEDROL DOSEPAK) 4 MG tablet therapy pack Follow Package Directions, Disp-21 tablet, R-0, E-Prescribe      oxyCODONE (ROXICODONE) 5 MG tablet Take 1 tablet (5 mg) by mouth every 6 hours as needed for pain, Disp-5 tablet, R-0, Local Print                  =================================================================    HPI    Patient information was obtained from: Patient     Use of Interpretor: N/A         Maxi Hoffman is a 71 year old male with a pertinent history of lumbar radiculopathy who presents to this ED by walk in for evaluation of low back pain.     Patient reports that he woke up around 0300 this morning with a twinge in his low back, but he was able to walk to the bathroom and fall back asleep. He then awoke at 0500 and he was unable to get out of bed. He states that he had to crawl to the bathroom due to the pain and difficulty finding something to hold onto and pull himself upright. He has pain in his bilateral low back, left side greater than right. His pain is worsened with movement and when he tries to stand upright. The pain does not radiate into his legs. He  took Advil and his usual Celebrex this morning without significant relief. Additionally, patient reports that he has had ongoing left shoulder pain for the past 3 weeks which makes it difficult to find a comfortable sleeping position which may have exacerbated his back pain. He notes that he has a history of lumbar back problems for which he has had steroid injections in the past; no prior back surgeries. No recent falls. Patient notes that he went golfing 2 days ago, but he felt fine before bed last night. He denies fever, chills, numbness/tinging in his legs, saddle anesthesia, leg weakness, bowel/bladder incontinence, difficulty urinating, or additional symptoms at this time.         REVIEW OF SYSTEMS   Review of Systems   Constitutional: Negative for chills and fever.   Respiratory: Negative for shortness of breath.    Cardiovascular: Negative for chest pain and leg swelling.   Gastrointestinal: Negative for nausea and vomiting.   Genitourinary: Negative for difficulty urinating, dysuria, frequency and hematuria.   Musculoskeletal: Positive for arthralgias (left shoulder pain) and back pain (low back, L > R).   Skin: Negative for rash.   Neurological: Negative for weakness and numbness.        Negative for bowel/bladder incontinence   All other systems reviewed and are negative.      PAST MEDICAL HISTORY:  History reviewed. No pertinent past medical history.    PAST SURGICAL HISTORY:  Past Surgical History:   Procedure Laterality Date     COMBINED CYSTOSCOPY, INSERT STENT URETER(S) Left 1/4/2019    Procedure: CYSTOSCOPY, LEFT URETEROSCOPY LASER LITHOTRIPSY STENT INSERTION;  Surgeon: Daniel Dean MD;  Location: [facility];  Service: Urology     OTHER SURGICAL HISTORY      never had surgery           CURRENT MEDICATIONS:    No current facility-administered medications for this encounter.    Current Outpatient Medications:      celecoxib (CELEBREX) 200 MG capsule, [CELECOXIB (CELEBREX) 200 MG  CAPSULE] Take 200 mg by mouth., Disp: , Rfl:      cyclobenzaprine (FLEXERIL) 10 MG tablet, Take 1 tablet (10 mg) by mouth 3 times daily as needed for muscle spasms, Disp: 20 tablet, Rfl: 0     methylPREDNISolone (MEDROL DOSEPAK) 4 MG tablet therapy pack, Follow Package Directions, Disp: 21 tablet, Rfl: 0     oxyCODONE (ROXICODONE) 5 MG tablet, Take 1 tablet (5 mg) by mouth every 6 hours as needed for pain, Disp: 5 tablet, Rfl: 0     tamsulosin (FLOMAX) 0.4 mg cap, [TAMSULOSIN (FLOMAX) 0.4 MG CAP] Take 1 capsule (0.4 mg total) by mouth daily., Disp: 90 capsule, Rfl: 3     acetaminophen (TYLENOL) 500 MG tablet, [ACETAMINOPHEN (TYLENOL) 500 MG TABLET] Take 500 mg by mouth every 6 (six) hours as needed for pain., Disp: , Rfl:      atorvastatin (LIPITOR) 20 MG tablet, [ATORVASTATIN (LIPITOR) 20 MG TABLET] Take 20 mg by mouth., Disp: , Rfl:      IBUPROFEN ORAL, [IBUPROFEN ORAL] Take 200 mg by mouth as needed.       , Disp: , Rfl:       ALLERGIES:  Allergies   Allergen Reactions     Levaquin [Levofloxacin] Itching       FAMILY HISTORY:  Family History   Problem Relation Age of Onset     Cancer Mother         breast     Diabetes Father      Arthritis Father      Urolithiasis Brother        SOCIAL HISTORY:   Social History     Socioeconomic History     Marital status:      Spouse name: Not on file     Number of children: Not on file     Years of education: Not on file     Highest education level: Not on file   Occupational History     Not on file   Tobacco Use     Smoking status: Never Smoker     Smokeless tobacco: Never Used   Substance and Sexual Activity     Alcohol use: No     Drug use: No     Sexual activity: Not on file   Other Topics Concern     Not on file   Social History Narrative     Not on file     Social Determinants of Health     Financial Resource Strain:      Difficulty of Paying Living Expenses:    Food Insecurity:      Worried About Running Out of Food in the Last Year:      Ran Out of Food in the  Last Year:    Transportation Needs:      Lack of Transportation (Medical):      Lack of Transportation (Non-Medical):    Physical Activity:      Days of Exercise per Week:      Minutes of Exercise per Session:    Stress:      Feeling of Stress :    Social Connections:      Frequency of Communication with Friends and Family:      Frequency of Social Gatherings with Friends and Family:      Attends Mandaen Services:      Active Member of Clubs or Organizations:      Attends Club or Organization Meetings:      Marital Status:    Intimate Partner Violence:      Fear of Current or Ex-Partner:      Emotionally Abused:      Physically Abused:      Sexually Abused:        VITALS:  07/19 BP Temp Temp src Pulse Resp SpO2   1111 218/90 -- -- 58 16 99 %   0827 189/85 97.3  F (36.3  C) Temporal 56 16 99 %   0817 216/96 -- -- -- -- --       PHYSICAL EXAM   Constitutional: Well developed, Well nourished, NAD  HENT: Normocephalic, Atraumatic, mucous membranes moist  Neck-  Normal range of motion, No tenderness, Supple, No stridor.   Eyes: Conjunctiva normal, No discharge.   Respiratory: Normal breath sounds, No respiratory distress, No wheezing, Speaks full sentences easily. No cough.  Cardiovascular: Normal heart rate, Regular rhythm, No murmurs, No rubs, No gallops. Chest wall nontender.  GI: Soft, No tenderness, No masses, No flank tenderness. No rebound or guarding.  Musculoskeletal: 2+ DP pulses bilaterally. No edema. No cyanosis, No clubbing. Good range of motion in all major joints. Bilateral paraspinal lumbar tenderness and point tenderness to left lateral low back musculature. Lumbar flexion and extension slightly diminished. Straight leg raise positive on left. No midline spinal tenderness. No step off deformities. No overlying skin changes. No foot drop. 5/5 strength for the bilateral hip flexors, knee flexors/extensors, ankle dorsiflexors/plantar flexors, great toe extensors, ankle evertors/invertors.  Integument:  Warm, Dry, No erythema, No rash. No petechiae.  Neurologic: Patient is alert and oriented ×3. Face is symmetric. Speech is normal. Strength is full and equal in both upper and lower extremities. Sensory is intact. Coordination is intact. Patellar and achilles reflexes are 2+ and symmetric. No ankle clonus bilaterally.  Psychiatric: Affect normal, Judgment normal, Mood normal. Cooperative.     LAB:  All pertinent labs reviewed and interpreted.  Labs Ordered and Resulted from Time of ED Arrival Up to the Time of Departure from the ED   ROUTINE UA WITH MICROSCOPIC REFLEX TO CULTURE - Abnormal; Notable for the following components:       Result Value    Bacteria Urine Few (*)     Mucus Urine Present (*)     All other components within normal limits    Narrative:     Urine Culture not indicated       RADIOLOGY:  Reviewed all pertinent imaging. Please see official radiology report.  Lumbar spine CT w/o contrast   Final Result   IMPRESSION:   1.  Grade 1 degenerative anterolisthesis of L5 on S1. Combination of degenerative interspace narrowing and shallow disc osteophyte complex and listhesis at this level results in severe right and moderate left far lateral up-down foraminal stenosis with    borderline canal narrowing.   2.  Sigmoid diverticulosis.          I, Janell Pete, am serving as a scribe to document services personally performed by Mariia Boothe PA-C based on my observation and the provider's statements to me. I, Mariia Boothe PA-C attest that Janell Pete is acting in a scribe capacity, has observed my performance of the services and has documented them in accordance with my direction.    Mariia Boothe PA-C  Emergency Medicine  [facility]  7/19/2021     Mariia Boothe PA-C  07/20/21 1114

## 2021-07-20 ASSESSMENT — ENCOUNTER SYMPTOMS
DYSURIA: 0
HEMATURIA: 0
NAUSEA: 0
SHORTNESS OF BREATH: 0
VOMITING: 0
FREQUENCY: 0

## 2021-11-17 ENCOUNTER — HOSPITAL ENCOUNTER (EMERGENCY)
Facility: CLINIC | Age: 71
Discharge: HOME | End: 2021-11-17
Attending: EMERGENCY MEDICINE | Admitting: EMERGENCY MEDICINE
Payer: COMMERCIAL

## 2021-11-17 ENCOUNTER — APPOINTMENT (OUTPATIENT)
Dept: CT IMAGING | Facility: CLINIC | Age: 71
End: 2021-11-17
Attending: EMERGENCY MEDICINE
Payer: COMMERCIAL

## 2021-11-17 ENCOUNTER — VIRTUAL VISIT (OUTPATIENT)
Dept: UROLOGY | Facility: CLINIC | Age: 71
End: 2021-11-17
Payer: COMMERCIAL

## 2021-11-17 ENCOUNTER — TELEPHONE (OUTPATIENT)
Dept: UROLOGY | Facility: CLINIC | Age: 71
End: 2021-11-17

## 2021-11-17 VITALS
RESPIRATION RATE: 16 BRPM | SYSTOLIC BLOOD PRESSURE: 183 MMHG | OXYGEN SATURATION: 98 % | BODY MASS INDEX: 24.08 KG/M2 | HEART RATE: 90 BPM | HEIGHT: 71 IN | DIASTOLIC BLOOD PRESSURE: 91 MMHG | TEMPERATURE: 98 F | WEIGHT: 172 LBS

## 2021-11-17 DIAGNOSIS — N20.1 URETERAL STONE: ICD-10-CM

## 2021-11-17 DIAGNOSIS — N20.0 KIDNEY STONE: ICD-10-CM

## 2021-11-17 DIAGNOSIS — R10.9 FLANK PAIN: ICD-10-CM

## 2021-11-17 DIAGNOSIS — N20.1 URETEROLITHIASIS: ICD-10-CM

## 2021-11-17 DIAGNOSIS — N20.1 CALCULUS OF URETER: Primary | ICD-10-CM

## 2021-11-17 LAB
ALBUMIN SERPL-MCNC: 4 G/DL (ref 3.5–5)
ALBUMIN UR-MCNC: NEGATIVE MG/DL
ALP SERPL-CCNC: 92 U/L (ref 45–120)
ALT SERPL W P-5'-P-CCNC: 18 U/L (ref 0–45)
ANION GAP SERPL CALCULATED.3IONS-SCNC: 8 MMOL/L (ref 5–18)
APPEARANCE UR: CLEAR
AST SERPL W P-5'-P-CCNC: 20 U/L (ref 0–40)
BASOPHILS # BLD AUTO: 0 10E3/UL (ref 0–0.2)
BASOPHILS NFR BLD AUTO: 0 %
BILIRUB SERPL-MCNC: 0.5 MG/DL (ref 0–1)
BILIRUB UR QL STRIP: NEGATIVE
BUN SERPL-MCNC: 22 MG/DL (ref 8–28)
C REACTIVE PROTEIN LHE: 0.4 MG/DL (ref 0–0.8)
CALCIUM SERPL-MCNC: 10.8 MG/DL (ref 8.5–10.5)
CHLORIDE BLD-SCNC: 103 MMOL/L (ref 98–107)
CO2 SERPL-SCNC: 26 MMOL/L (ref 22–31)
COLOR UR AUTO: ABNORMAL
CREAT SERPL-MCNC: 1.23 MG/DL (ref 0.7–1.3)
EOSINOPHIL # BLD AUTO: 0 10E3/UL (ref 0–0.7)
EOSINOPHIL NFR BLD AUTO: 0 %
ERYTHROCYTE [DISTWIDTH] IN BLOOD BY AUTOMATED COUNT: 13.2 % (ref 10–15)
GFR SERPL CREATININE-BSD FRML MDRD: 59 ML/MIN/1.73M2
GLUCOSE BLD-MCNC: 161 MG/DL (ref 70–125)
GLUCOSE UR STRIP-MCNC: 70 MG/DL
HCT VFR BLD AUTO: 45.2 % (ref 40–53)
HGB BLD-MCNC: 15.5 G/DL (ref 13.3–17.7)
HGB UR QL STRIP: ABNORMAL
HYALINE CASTS: 1 /LPF
IMM GRANULOCYTES # BLD: 0 10E3/UL
IMM GRANULOCYTES NFR BLD: 0 %
KETONES UR STRIP-MCNC: NEGATIVE MG/DL
LEUKOCYTE ESTERASE UR QL STRIP: NEGATIVE
LIPASE SERPL-CCNC: 11 U/L (ref 0–52)
LYMPHOCYTES # BLD AUTO: 0.8 10E3/UL (ref 0.8–5.3)
LYMPHOCYTES NFR BLD AUTO: 8 %
MCH RBC QN AUTO: 31.2 PG (ref 26.5–33)
MCHC RBC AUTO-ENTMCNC: 34.3 G/DL (ref 31.5–36.5)
MCV RBC AUTO: 91 FL (ref 78–100)
MONOCYTES # BLD AUTO: 0.7 10E3/UL (ref 0–1.3)
MONOCYTES NFR BLD AUTO: 7 %
MUCOUS THREADS #/AREA URNS LPF: PRESENT /LPF
NEUTROPHILS # BLD AUTO: 7.9 10E3/UL (ref 1.6–8.3)
NEUTROPHILS NFR BLD AUTO: 85 %
NITRATE UR QL: NEGATIVE
NRBC # BLD AUTO: 0 10E3/UL
NRBC BLD AUTO-RTO: 0 /100
PH UR STRIP: 5.5 [PH] (ref 5–7)
PLATELET # BLD AUTO: 217 10E3/UL (ref 150–450)
POTASSIUM BLD-SCNC: 3.8 MMOL/L (ref 3.5–5)
PROT SERPL-MCNC: 7.3 G/DL (ref 6–8)
RBC # BLD AUTO: 4.97 10E6/UL (ref 4.4–5.9)
RBC URINE: 74 /HPF
SARS-COV-2 RNA RESP QL NAA+PROBE: NEGATIVE
SODIUM SERPL-SCNC: 137 MMOL/L (ref 136–145)
SP GR UR STRIP: 1.02 (ref 1–1.03)
UROBILINOGEN UR STRIP-MCNC: <2 MG/DL
WBC # BLD AUTO: 9.4 10E3/UL (ref 4–11)
WBC URINE: 3 /HPF

## 2021-11-17 PROCEDURE — 36415 COLL VENOUS BLD VENIPUNCTURE: CPT | Performed by: EMERGENCY MEDICINE

## 2021-11-17 PROCEDURE — 96375 TX/PRO/DX INJ NEW DRUG ADDON: CPT

## 2021-11-17 PROCEDURE — 99285 EMERGENCY DEPT VISIT HI MDM: CPT | Mod: 25

## 2021-11-17 PROCEDURE — 87635 SARS-COV-2 COVID-19 AMP PRB: CPT | Performed by: EMERGENCY MEDICINE

## 2021-11-17 PROCEDURE — 83690 ASSAY OF LIPASE: CPT | Performed by: EMERGENCY MEDICINE

## 2021-11-17 PROCEDURE — 81001 URINALYSIS AUTO W/SCOPE: CPT | Performed by: EMERGENCY MEDICINE

## 2021-11-17 PROCEDURE — 250N000011 HC RX IP 250 OP 636: Performed by: EMERGENCY MEDICINE

## 2021-11-17 PROCEDURE — 99213 OFFICE O/P EST LOW 20 MIN: CPT | Mod: GT | Performed by: UROLOGY

## 2021-11-17 PROCEDURE — 96374 THER/PROPH/DIAG INJ IV PUSH: CPT

## 2021-11-17 PROCEDURE — 74176 CT ABD & PELVIS W/O CONTRAST: CPT

## 2021-11-17 PROCEDURE — 250N000013 HC RX MED GY IP 250 OP 250 PS 637: Performed by: EMERGENCY MEDICINE

## 2021-11-17 PROCEDURE — 85025 COMPLETE CBC W/AUTO DIFF WBC: CPT | Performed by: EMERGENCY MEDICINE

## 2021-11-17 PROCEDURE — 96361 HYDRATE IV INFUSION ADD-ON: CPT

## 2021-11-17 PROCEDURE — C9803 HOPD COVID-19 SPEC COLLECT: HCPCS

## 2021-11-17 PROCEDURE — 258N000003 HC RX IP 258 OP 636: Performed by: EMERGENCY MEDICINE

## 2021-11-17 PROCEDURE — 80053 COMPREHEN METABOLIC PANEL: CPT | Performed by: EMERGENCY MEDICINE

## 2021-11-17 PROCEDURE — 86141 C-REACTIVE PROTEIN HS: CPT | Performed by: EMERGENCY MEDICINE

## 2021-11-17 RX ORDER — ONDANSETRON 2 MG/ML
4 INJECTION INTRAMUSCULAR; INTRAVENOUS EVERY 30 MIN PRN
Status: DISCONTINUED | OUTPATIENT
Start: 2021-11-17 | End: 2021-11-17 | Stop reason: HOSPADM

## 2021-11-17 RX ORDER — DIMENHYDRINATE 50 MG
50 TABLET ORAL EVERY 6 HOURS PRN
Qty: 28 TABLET | Refills: 0 | Status: SHIPPED | OUTPATIENT
Start: 2021-11-17 | End: 2021-11-24

## 2021-11-17 RX ORDER — KETOROLAC TROMETHAMINE 15 MG/ML
15 INJECTION, SOLUTION INTRAMUSCULAR; INTRAVENOUS ONCE
Status: COMPLETED | OUTPATIENT
Start: 2021-11-17 | End: 2021-11-17

## 2021-11-17 RX ORDER — ACETAMINOPHEN 500 MG
1000 TABLET ORAL EVERY 6 HOURS
Qty: 56 TABLET | Refills: 0 | Status: SHIPPED | OUTPATIENT
Start: 2021-11-17 | End: 2021-11-24

## 2021-11-17 RX ORDER — IBUPROFEN 200 MG
400 TABLET ORAL EVERY 6 HOURS
Qty: 56 TABLET | Refills: 0 | Status: SHIPPED | OUTPATIENT
Start: 2021-11-17 | End: 2021-11-24

## 2021-11-17 RX ORDER — OXYCODONE HYDROCHLORIDE 5 MG/1
5 TABLET ORAL ONCE
Status: COMPLETED | OUTPATIENT
Start: 2021-11-17 | End: 2021-11-17

## 2021-11-17 RX ORDER — OXYCODONE HYDROCHLORIDE 5 MG/1
5 TABLET ORAL EVERY 4 HOURS PRN
Qty: 12 TABLET | Refills: 0 | Status: SHIPPED | OUTPATIENT
Start: 2021-11-17 | End: 2021-11-21

## 2021-11-17 RX ADMIN — OXYCODONE HYDROCHLORIDE 5 MG: 5 TABLET ORAL at 02:40

## 2021-11-17 RX ADMIN — ONDANSETRON 4 MG: 2 INJECTION INTRAMUSCULAR; INTRAVENOUS at 01:30

## 2021-11-17 RX ADMIN — KETOROLAC TROMETHAMINE 15 MG: 15 INJECTION, SOLUTION INTRAMUSCULAR; INTRAVENOUS at 01:48

## 2021-11-17 RX ADMIN — SODIUM CHLORIDE 1000 ML: 9 INJECTION, SOLUTION INTRAVENOUS at 01:47

## 2021-11-17 ASSESSMENT — ENCOUNTER SYMPTOMS
FEVER: 0
NAUSEA: 1
CHILLS: 1
VOMITING: 1
HEMATURIA: 0
FLANK PAIN: 1
ABDOMINAL PAIN: 0

## 2021-11-17 ASSESSMENT — MIFFLIN-ST. JEOR: SCORE: 1562.32

## 2021-11-17 ASSESSMENT — PAIN SCALES - GENERAL: PAINLEVEL: MILD PAIN (2)

## 2021-11-17 NOTE — PATIENT INSTRUCTIONS
Patient Stated Goal: Pass my stone  Symptom Control While Passing A Stone    The goal of [facility] is to let a smaller kidney stone (less than 4 to 5 mm) pass without intervention if possible. Giving your body a chance to clear the stone may take a few hours up to a few weeks.  Keeping you well-informed, safe and fairly comfortable is important.    Drink to thirst  Do not attempt to  flush out  your stone by drinking too much fluid. This does not work and may increase nausea. Drink enough to satisfy your body s thirst. Eating your normal diet is fine.   Medications (that may be suggested or prescribed)    Ibuprofen (Advil or Motrin) Available over the counter  o Take two (200mg) tablets every six hours until the stone passes.  o Prevents spasm of the ureter.    o Decreases pain.      Dramamine* (drowsy version, non-generic formulation) Available over the counter  o Take 50mg at bedtime  o Decreases spasms of the ureter  o Decreases nausea  o Decreases acute pain  o Decreases recurrence of pain for 24 hours  o Will help you sleep  *This medication will cause increase drowsiness, do not drive or operate machinery for 6 hours.      Narcotics (Percocet, Vicodin, Dilaudid) Take as prescribed for severe pain unrelieved by ibuprofen and Dramamine  o Narcotics have significant side effects and only  cover-up  pain. They have no effect on the cause of pain.  o Common side effects  - Confusion, disorientation and sedation - DO NOT DRIVE OR OPERATE MACHINERY WITHIN 24 HOURS  - Nausea - take Dramamine or Zofran or Haldol to help control  - Constipation  - Sleep disturbances      Ondansetron (Zofran) Take as prescribed  o Reserve for severe nausea  o May cause constipation, start over the counter Miralax if needed      Second Line Anti-Nausea Medication: Adding a different anti-nausea medication maybe helpful for persistent nausea.  The combined effect of different types of anti-nausea medications maybe more  effective than either medication by itself, even in higher doses.  o Compazine: Take as prescribed      Information about kidney stones    Crystals can form if chemicals are too concentrated in your urine. If the crystal grows over time, a stone may form. A stone usually isn t painful while it is still in the kidney.    As the stone begins to leave the kidney, you may experience episodes of flank pain as the kidney stone approaches the entrance to the ureter. Some people feel a vague ache in the side.    Kidney stones may fall into the ureter. Some stones are tiny and pass through without causing symptoms. The ureter is a small tube (approximately 1/8 of an inch wide). A kidney stone can get stuck and block the ureter. If this happens, urine backs up and flows back to the kidney. Back pressure on the kidney can cause:  o Severe flank pain radiating to the groin.  o Severe nausea and vomiting.  o The pain can occur in the lower back, side, groin or all three.      When the stone reaches the lower ureter, this can irritate the bladder and sensations of feeling the urge to urinate frequently and urgently may occur.      Once the stone passes out of your ureter and into your bladder, the symptoms of urgency and frequency will often disappear. Sometimes pain will come back for a short period and will not be as severe as before. The passage of the stone from your bladder and out of your body is usually not a problem. The urethra is at least twice as wide as the normal ureter, so the stone doesn t usually block it.    Strain all urine  If you pass the stone, save it. Place it in the container we have provided and bring it to the [facility] within a week of passing it. Your stone will then be sent for analysis which takes about a month.     Signs and symptoms you might experience    Nausea    Decreased appetite    Urinary frequency    Bloody urine     Chills    Fatigue    When to call [facility] or  go to the Emergency Room    Fever with a temperature greater than 100.1    Severe pain    Persistent nausea/vomiting    If the pain worsens or nausea/vomiting is uncontrolled with medications, STOP eating & drinking. You need to have an empty stomach for 8 hours prior to surgery. Call the [facility] immediately at [phone].           Follow-up    Low dose CT scan with doctor visit 1-2 weeks after initial clinic visit per doctor s instructions    Please cancel the CT scan visit if you pass a stone. Reschedule for a one month follow-up with doctor to discuss stone composition and future prevention.    Preventing future stones    Approximately a month after your stone is sent out for analysis, a prevention visit will occur with your provider, to discuss an individualized plan for prevention of new stones and to discuss managing stones that you may still have. Along with the analysis of the kidney stone, blood and urine tests may be indicated to develop this plan. Knowing the type of kidney stones you make, and why, allows the providers at the [facility] to recommend specific ways to prevent them.    Follow-up visits are an important part of monitoring and preventing future re-occurrences.    The [facility] is available for questions or concerns 24 hours a day at [phone]

## 2021-11-17 NOTE — TELEPHONE ENCOUNTER
Message left for patient to call clinic to set up an appointment for kidney stone follow-up for today or tomorrow.  Lakeshia Jacksno RN

## 2021-11-17 NOTE — PROGRESS NOTES
Patient states that they are in [place] at the time of their visit.  Patient is aware telehealth visit is billable and agrees to proceed.  Lakeshia Jackson RN

## 2021-11-17 NOTE — ED TRIAGE NOTES
"Pt here with daughter. Reports left flank pain x 2 days with N & V.Pt reports improvement in flank pain with emesis. Denies nausea at this time. Pt states he had a surgery ~ 2019 for removal of \" a bunch of kidney stones.\" States he used heat pack to left flank yesterday ~ 1400 and burned his skin without realizing, blisters present. VSS. HR elevated. ABCs intact.  "

## 2021-11-17 NOTE — PROGRESS NOTES
Assessment/Plan:    Assessment & Plan   Maxi was seen today for follow up.    Diagnoses and all orders for this visit:    Calculus of ureter  -     Patient Stated Goal: Pass my stone  -     CT Abdomen Pelvis w/o Contrast; Future        Stone Management Plan  Stone Management 11/17/2021   Urinary Tract Infection No suspicion of infection   Renal Colic Well controlled symptoms   Renal Failure No suspicion of renal failure   Current CT date 11/17/2021   Right sided stones? No   R Stone Event No current event   Left sided stones? Yes   L Number of ureteral stones 2   L GSD of ureteral stones 5   L Location of ureteral stone Distal   L Number of kidney stones  No renal stones   L Hydronephrosis Mild   L Stone Event New event   Diagnosis date 11/17/2021   Initial location of primary symptomatic stone Distal   Initial GSD of primary symptomatic stone 5   L MET Status Initiation   L Current Plan MET   MET 2 week F/U           PLAN      Phone call duration: 10 minutes  15 minutes spent on the date of the encounter doing chart review, history and exam, documentation and further activities per the note    DANTE KAHN MD  [facility]    Subjective:     HPI  Mr. Maxi Hoffman is a 71 year old  male who is being evaluated via a billable telephone visit by [facility] for unanticipated visit with acute exacerbation of chronic stone disease.      He is a recurrent calcium oxalate stone former who has required stone clearance procedures.     Acute onset left flank pain yesterday. Pain under good control since. No fever or chills.    CT scan is personally reviewed and demonstrates a 5 mm left proximal stone and a second 5 mm UVJ stone..    Significant labs from presentation include normal findings.    Will proceed with medical expulsive therapy. Risks and benefits were detailed of medical expulsive therapy including probability of stone passage,  recurrent renal colic, and requirement of emergency medical and/or surgical care and further imaging. Patient verbalized understanding. Patient agrees with plan as discussed. He will return in 2 weeks with low dose CT scan.    Over the counter symptom control medications of ibuprofen, Dramamine and Tylenol were recommended.         ROS   Review of systems is negative except for HPI.    No past medical history on file.    Past Surgical History:   Procedure Laterality Date     COMBINED CYSTOSCOPY, INSERT STENT URETER(S) Left 1/4/2019    Procedure: CYSTOSCOPY, LEFT URETEROSCOPY LASER LITHOTRIPSY STENT INSERTION;  Surgeon: Daniel Dean MD;  Location: [facility];  Service: Urology     OTHER SURGICAL HISTORY      never had surgery       Current Outpatient Medications   Medication Sig Dispense Refill     acetaminophen (TYLENOL) 500 MG tablet Take 2 tablets (1,000 mg) by mouth every 6 hours for 7 days 56 tablet 0     celecoxib (CELEBREX) 200 MG capsule [CELECOXIB (CELEBREX) 200 MG CAPSULE] Take 200 mg by mouth.       dimenhyDRINATE (DRAMAMINE) 50 MG tablet Take 1 tablet (50 mg) by mouth every 6 hours as needed for other (kidney stone pain management) 28 tablet 0     ibuprofen (ADVIL/MOTRIN) 200 MG tablet Take 2 tablets (400 mg) by mouth every 6 hours for 7 days 56 tablet 0     IBUPROFEN ORAL [IBUPROFEN ORAL] Take 200 mg by mouth as needed.              tamsulosin (FLOMAX) 0.4 mg cap [TAMSULOSIN (FLOMAX) 0.4 MG CAP] Take 1 capsule (0.4 mg total) by mouth daily. 90 capsule 3     atorvastatin (LIPITOR) 20 MG tablet [ATORVASTATIN (LIPITOR) 20 MG TABLET] Take 20 mg by mouth.       cyclobenzaprine (FLEXERIL) 10 MG tablet Take 1 tablet (10 mg) by mouth 3 times daily as needed for muscle spasms (Patient not taking: Reported on 11/17/2021) 20 tablet 0     methylPREDNISolone (MEDROL DOSEPAK) 4 MG tablet therapy pack Follow Package Directions (Patient not taking: Reported on 11/17/2021) 21 tablet 0     oxyCODONE  (ROXICODONE) 5 MG tablet Take 1 tablet (5 mg) by mouth every 4 hours as needed for severe pain If pain is not improved with acetaminophen and ibuprofen. (Patient not taking: Reported on 11/17/2021) 12 tablet 0       Allergies   Allergen Reactions     Levaquin [Levofloxacin] Itching       Social History     Socioeconomic History     Marital status:      Spouse name: Not on file     Number of children: Not on file     Years of education: Not on file     Highest education level: Not on file   Occupational History     Not on file   Tobacco Use     Smoking status: Never Smoker     Smokeless tobacco: Never Used   Substance and Sexual Activity     Alcohol use: No     Drug use: No     Sexual activity: Not on file   Other Topics Concern     Not on file   Social History Narrative     Not on file     Social Determinants of Health     Financial Resource Strain: Not on file   Food Insecurity: Not on file   Transportation Needs: Not on file   Physical Activity: Not on file   Stress: Not on file   Social Connections: Not on file   Intimate Partner Violence: Not on file   Housing Stability: Not on file       Family History   Problem Relation Age of Onset     Cancer Mother         breast     Diabetes Father      Arthritis Father      Urolithiasis Brother        Objective:     No vitals or physical exam obtained due to virtual visit  Labs     Most Recent 3 CBC's:Recent Labs   Lab Test 11/17/21  0123   WBC 9.4   HGB 15.5   MCV 91        Most Recent 3 BMP's:Recent Labs   Lab Test 11/17/21  0123      POTASSIUM 3.8   CHLORIDE 103   CO2 26   BUN 22   CR 1.23   ANIONGAP 8   SPARKLE 10.8*   *     Most Recent Urinalysis:Recent Labs   Lab Test 11/17/21  0130 07/19/21  0953 02/13/19  1048   COLOR Light Yellow   < > Yellow   APPEARANCE Clear   < > Clear   URINEGLC 70 *   < > Negative   URINEBILI Negative   < > Negative   URINEKETONE Negative   < > Trace*   SG 1.016   < > 1.020   UBLD 1.0 mg/dL*   < > Negative   URINEPH  5.5   < > 5.5   PROTEIN Negative   < > Negative   UROBILINOGEN  --   --  0.2 E.U./dL   NITRITE Negative   < > Negative   LEUKEST Negative   < > Negative   RBCU 74*   < >  --    WBCU 3   < >  --     < > = values in this interval not displayed.     Acute Labs   Urine Culture    Culture   Date Value Ref Range Status   01/09/2019 No Growth  Final   10/23/2018 No Growth  Final

## 2021-11-17 NOTE — ED PROVIDER NOTES
NAME: Maix Hoffman  AGE: 71 year old male  YOB: 1950  MRN: [id number]  EVALUATION DATE & TIME: 2021  1:13 AM    PCP: Storm Cobos    ED PROVIDER: Jem Concepcion M.D.    Chief Complaint   Patient presents with     Flank Pain     left     FINAL IMPRESSION:  1. Ureterolithiasis    2. Flank pain    3. Kidney stone    4. Ureteral stone      MEDICAL DECISION MAKIN:30 AM I met with the patient, obtained history, performed an initial exam, and discussed options and plan for diagnostics and treatment here in the ED.   Pertinent Labs & Imaging studies reviewed. (See chart for details)  2:33 AM The patient is feeling improved.        Patient was clinically assessed and consented to treatment. After assessment, medical decision making and workup were discussed with the patient. The patient was agreeable to plan for testing, workup, and treatment.    Maxi Hoffman is a 71 year old male who presents with left flank pain.   Differential diagnosis includes but not limited to kidney stone, pyonephritis, infected kidney stone, hydronephrosis, acute kidney injury.  Patient with history of kidney stones and in 2019 had to have cystoscopy for removal.  Patient with 2 days of flank pain but no fevers or chills and no signs of infection.  IV was established and labs were sent.  Patient was given Toradol for pain and Zofran for nausea which helped greatly.  Patient much more comfortable and plan for CT scan.  CBC unremarkable, metabolic panel did not show any creatinine elevation CRP was normal.  Urinalysis showed hematuria but no signs of infection.  Patient sent for CT scan which did show to left ureter stones.  The first 1 was right at the left UVJ and was 4 mm and likely passing.  Patient had gotten up to urinate multiple times in the ER and possibly passed a stone during this time however still had a stone in the left upper ureter.  There was some mild hydronephrosis but no signs of injury  to the kidney.  Given patient's well appearance and pain control I feel patient could proceed with expectant management with close follow-up with the kidney stone [place].  He had previously seen Dr. Dean and will be referred to kidney stone [place].  Patient given an oral dose of pain medication to continue pain control for the night and will fill prescriptions in the morning.  Patient comfortable with plan and after continue pain control will be plan for discharge home with follow-up to kidney stone [place].  Patient was preemptively swabbed for COVID-19 for possible cystoscopy if unable to pass the larger proximal stone.    The importance of close follow up was discussed. We reviewed warning signs and symptoms, and I instructed Mr. Hoffman to return to the emergency department immediately if he develops any new or worsening symptoms. I provided additional verbal discharge instructions. Mr. Hoffman expressed understanding and agreement with this plan of care, his questions were answered, and he was discharged in stable condition.       0 minutes of critical care time    MEDICATIONS GIVEN IN THE EMERGENCY:  Medications   ondansetron (ZOFRAN) injection 4 mg (4 mg Intravenous Given 11/17/21 0130)   ketorolac (TORADOL) injection 15 mg (15 mg Intravenous Given 11/17/21 0148)   0.9% sodium chloride BOLUS (0 mLs Intravenous Stopped 11/17/21 0236)   oxyCODONE (ROXICODONE) tablet 5 mg (5 mg Oral Given 11/17/21 0240)       NEW PRESCRIPTIONS STARTED AT TODAY'S ER VISIT:  Discharge Medication List as of 11/17/2021  2:36 AM      START taking these medications    Details   !! acetaminophen (TYLENOL) 500 MG tablet Take 2 tablets (1,000 mg) by mouth every 6 hours for 7 days, Disp-56 tablet, R-0, Local Print      dimenhyDRINATE (DRAMAMINE) 50 MG tablet Take 1 tablet (50 mg) by mouth every 6 hours as needed for other (kidney stone pain management), Disp-28 tablet, R-0, Local Print      !! ibuprofen (ADVIL/MOTRIN) 200  MG tablet Take 2 tablets (400 mg) by mouth every 6 hours for 7 days, Disp-56 tablet, R-0, Local Print      !! oxyCODONE (ROXICODONE) 5 MG tablet Take 1 tablet (5 mg) by mouth every 4 hours as needed for severe pain If pain is not improved with acetaminophen and ibuprofen., Disp-12 tablet, R-0, Local Print       !! - Potential duplicate medications found. Please discuss with provider.             =================================================================    HPI    Patient information was obtained from: Patient    Use of : N/A       Maxi Hoffman is a 71 year old male with a past medical history of kidney stones, who presents for evaluation of flank pain.    The patient reports about one day of left flank pain.  He reports this feels like previous kidney stones.  He last had stones in March of 2019 and had to have them removed and have a stent placed.  He does report associated nausea, vomiting, and chills.  He has not taken any pain medication tonight, but he took an Oxy and muscle relaxer yesterday.  He denies any abdominal pain, fevers, hematuria, dark colored urine, or other complaints at this time.     REVIEW OF SYSTEMS   Review of Systems   Constitutional: Positive for chills. Negative for fever.   Gastrointestinal: Positive for nausea and vomiting. Negative for abdominal pain.   Genitourinary: Positive for flank pain (left). Negative for hematuria.        Negative for dark colored urine   All other systems reviewed and are negative.     PAST MEDICAL HISTORY:  History reviewed. No pertinent past medical history.    PAST SURGICAL HISTORY:  Past Surgical History:   Procedure Laterality Date     COMBINED CYSTOSCOPY, INSERT STENT URETER(S) Left 1/4/2019    Procedure: CYSTOSCOPY, LEFT URETEROSCOPY LASER LITHOTRIPSY STENT INSERTION;  Surgeon: Daniel Dean MD;  Location: [facility];  Service: Urology     OTHER SURGICAL HISTORY      never had surgery       CURRENT MEDICATIONS:       Current Facility-Administered Medications:      ondansetron (ZOFRAN) injection 4 mg, 4 mg, Intravenous, Q30 Min PRN, Jem Concepcion MD, 4 mg at 11/17/21 0130    Current Outpatient Medications:      acetaminophen (TYLENOL) 500 MG tablet, Take 2 tablets (1,000 mg) by mouth every 6 hours for 7 days, Disp: 56 tablet, Rfl: 0     dimenhyDRINATE (DRAMAMINE) 50 MG tablet, Take 1 tablet (50 mg) by mouth every 6 hours as needed for other (kidney stone pain management), Disp: 28 tablet, Rfl: 0     ibuprofen (ADVIL/MOTRIN) 200 MG tablet, Take 2 tablets (400 mg) by mouth every 6 hours for 7 days, Disp: 56 tablet, Rfl: 0     oxyCODONE (ROXICODONE) 5 MG tablet, Take 1 tablet (5 mg) by mouth every 4 hours as needed for severe pain If pain is not improved with acetaminophen and ibuprofen., Disp: 12 tablet, Rfl: 0     acetaminophen (TYLENOL) 500 MG tablet, [ACETAMINOPHEN (TYLENOL) 500 MG TABLET] Take 500 mg by mouth every 6 (six) hours as needed for pain., Disp: , Rfl:      atorvastatin (LIPITOR) 20 MG tablet, [ATORVASTATIN (LIPITOR) 20 MG TABLET] Take 20 mg by mouth., Disp: , Rfl:      celecoxib (CELEBREX) 200 MG capsule, [CELECOXIB (CELEBREX) 200 MG CAPSULE] Take 200 mg by mouth., Disp: , Rfl:      cyclobenzaprine (FLEXERIL) 10 MG tablet, Take 1 tablet (10 mg) by mouth 3 times daily as needed for muscle spasms, Disp: 20 tablet, Rfl: 0     IBUPROFEN ORAL, [IBUPROFEN ORAL] Take 200 mg by mouth as needed.       , Disp: , Rfl:      methylPREDNISolone (MEDROL DOSEPAK) 4 MG tablet therapy pack, Follow Package Directions, Disp: 21 tablet, Rfl: 0     oxyCODONE (ROXICODONE) 5 MG tablet, Take 1 tablet (5 mg) by mouth every 6 hours as needed for pain, Disp: 5 tablet, Rfl: 0     tamsulosin (FLOMAX) 0.4 mg cap, [TAMSULOSIN (FLOMAX) 0.4 MG CAP] Take 1 capsule (0.4 mg total) by mouth daily., Disp: 90 capsule, Rfl: 3    ALLERGIES:  Allergies   Allergen Reactions     Levaquin [Levofloxacin] Itching       FAMILY HISTORY:  Family  "History   Problem Relation Age of Onset     Cancer Mother         breast     Diabetes Father      Arthritis Father      Urolithiasis Brother        SOCIAL HISTORY:   Social History     Socioeconomic History     Marital status:      Spouse name: None     Number of children: None     Years of education: None     Highest education level: None   Occupational History     None   Tobacco Use     Smoking status: Never Smoker     Smokeless tobacco: Never Used   Substance and Sexual Activity     Alcohol use: No     Drug use: No     Sexual activity: None   Other Topics Concern     None   Social History Narrative     None     Social Determinants of Health     Financial Resource Strain: Not on file   Food Insecurity: Not on file   Transportation Needs: Not on file   Physical Activity: Not on file   Stress: Not on file   Social Connections: Not on file   Intimate Partner Violence: Not on file   Housing Stability: Not on file     PHYSICAL EXAM:    Vitals: BP (!) 183/91   Pulse 90   Temp 98  F (36.7  C) (Oral)   Resp 16   Ht 1.803 m (5' 11\")   Wt 78 kg (172 lb)   SpO2 98%   BMI 23.99 kg/m     Physical Exam  Vitals and nursing note reviewed.   Constitutional:       General: He is not in acute distress.     Appearance: Normal appearance. He is normal weight. He is not ill-appearing or toxic-appearing.   HENT:      Head: Normocephalic.   Eyes:      General: No scleral icterus.  Cardiovascular:      Rate and Rhythm: Normal rate and regular rhythm.      Heart sounds: Normal heart sounds.   Pulmonary:      Effort: Pulmonary effort is normal. No respiratory distress.      Breath sounds: Normal breath sounds. No stridor. No wheezing or rhonchi.   Abdominal:      General: There is no distension.      Palpations: Abdomen is soft.      Tenderness: There is no abdominal tenderness. There is left CVA tenderness. There is no right CVA tenderness or guarding.   Musculoskeletal:         General: Normal range of motion.      Cervical " back: Normal range of motion and neck supple. No tenderness.      Right lower leg: No edema.      Left lower leg: No edema.   Skin:     General: Skin is warm and dry.      Coloration: Skin is not pale.   Neurological:      General: No focal deficit present.      Mental Status: He is alert.   Psychiatric:         Mood and Affect: Mood normal.        LAB:  All pertinent labs reviewed and interpreted.  Labs Ordered and Resulted from Time of ED Arrival to Time of ED Departure   COMPREHENSIVE METABOLIC PANEL - Abnormal       Result Value    Sodium 137      Potassium 3.8      Chloride 103      Carbon Dioxide (CO2) 26      Anion Gap 8      Urea Nitrogen 22      Creatinine 1.23      Calcium 10.8 (*)     Glucose 161 (*)     Alkaline Phosphatase 92      AST 20      ALT 18      Protein Total 7.3      Albumin 4.0      Bilirubin Total 0.5      GFR Estimate 59 (*)    ROUTINE UA WITH MICROSCOPIC REFLEX TO CULTURE - Abnormal    Color Urine Light Yellow      Appearance Urine Clear      Glucose Urine 70  (*)     Bilirubin Urine Negative      Ketones Urine Negative      Specific Gravity Urine 1.016      Blood Urine 1.0 mg/dL (*)     pH Urine 5.5      Protein Albumin Urine Negative      Urobilinogen Urine <2.0      Nitrite Urine Negative      Leukocyte Esterase Urine Negative      Mucus Urine Present (*)     RBC Urine 74 (*)     WBC Urine 3      Hyaline Casts Urine 1     CRP INFLAMMATION - Normal    CRP 0.4     LIPASE - Normal    Lipase 11     CBC WITH PLATELETS AND DIFFERENTIAL    WBC Count 9.4      RBC Count 4.97      Hemoglobin 15.5      Hematocrit 45.2      MCV 91      MCH 31.2      MCHC 34.3      RDW 13.2      Platelet Count 217      % Neutrophils 85      % Lymphocytes 8      % Monocytes 7      % Eosinophils 0      % Basophils 0      % Immature Granulocytes 0      NRBCs per 100 WBC 0      Absolute Neutrophils 7.9      Absolute Lymphocytes 0.8      Absolute Monocytes 0.7      Absolute Eosinophils 0.0      Absolute Basophils 0.0       Absolute Immature Granulocytes 0.0      Absolute NRBCs 0.0     COVID-19 VIRUS (CORONAVIRUS) BY PCR       RADIOLOGY:  CT Abdomen Pelvis w/o Contrast   Final Result   IMPRESSION:    1.  4 mm stone at the ureterovesical junction and 5 mm stone in the proximal left ureter causes mild to moderate hydronephrosis.   2.  Additional nonobstructive nephrolithiasis on the left.   3.  Colonic diverticulosis.   4.  Hepatic cysts.   5.  Enlarged prostate gland.           PROCEDURES:   Procedures       I, Brien Moralez, am serving as a scribe to document services personally performed by Dr. Jem Concepcion  based on my observation and the provider's statements to me. I, Jem Concepcion MD attest that Brien Moralez is acting in a scribe capacity, has observed my performance of the services and has documented them in accordance with my direction.      Jem Concepcion M.D.  Emergency Medicine  [facility] EMERGENCY ROOM  [facility] [facility] [id number] [phone]  Dept: [phone]      Jem Concepcion MD  11/17/21 8316

## 2021-11-23 ENCOUNTER — HOSPITAL ENCOUNTER (EMERGENCY)
Facility: CLINIC | Age: 71
Discharge: HOME | End: 2021-11-23
Attending: EMERGENCY MEDICINE | Admitting: EMERGENCY MEDICINE
Payer: COMMERCIAL

## 2021-11-23 ENCOUNTER — APPOINTMENT (OUTPATIENT)
Dept: ULTRASOUND IMAGING | Facility: CLINIC | Age: 71
End: 2021-11-23
Attending: EMERGENCY MEDICINE
Payer: COMMERCIAL

## 2021-11-23 VITALS
SYSTOLIC BLOOD PRESSURE: 174 MMHG | OXYGEN SATURATION: 97 % | DIASTOLIC BLOOD PRESSURE: 83 MMHG | TEMPERATURE: 97.7 F | HEART RATE: 85 BPM | BODY MASS INDEX: 24.13 KG/M2 | WEIGHT: 173 LBS | RESPIRATION RATE: 18 BRPM

## 2021-11-23 DIAGNOSIS — N23 URETERAL COLIC: ICD-10-CM

## 2021-11-23 LAB
ALBUMIN UR-MCNC: 20 MG/DL
ANION GAP SERPL CALCULATED.3IONS-SCNC: 9 MMOL/L (ref 5–18)
APPEARANCE UR: ABNORMAL
BACTERIA #/AREA URNS HPF: ABNORMAL /HPF
BILIRUB UR QL STRIP: NEGATIVE
BUN SERPL-MCNC: 19 MG/DL (ref 8–28)
C REACTIVE PROTEIN LHE: 0.4 MG/DL (ref 0–0.8)
CALCIUM SERPL-MCNC: 10.5 MG/DL (ref 8.5–10.5)
CHLORIDE BLD-SCNC: 108 MMOL/L (ref 98–107)
CO2 SERPL-SCNC: 25 MMOL/L (ref 22–31)
COLOR UR AUTO: YELLOW
CREAT SERPL-MCNC: 1.24 MG/DL (ref 0.7–1.3)
ERYTHROCYTE [DISTWIDTH] IN BLOOD BY AUTOMATED COUNT: 13.4 % (ref 10–15)
GFR SERPL CREATININE-BSD FRML MDRD: 59 ML/MIN/1.73M2
GLUCOSE BLD-MCNC: 123 MG/DL (ref 70–125)
GLUCOSE UR STRIP-MCNC: NEGATIVE MG/DL
HCT VFR BLD AUTO: 43.3 % (ref 40–53)
HGB BLD-MCNC: 15.3 G/DL (ref 13.3–17.7)
HGB UR QL STRIP: ABNORMAL
KETONES UR STRIP-MCNC: ABNORMAL MG/DL
LEUKOCYTE ESTERASE UR QL STRIP: NEGATIVE
MCH RBC QN AUTO: 31.6 PG (ref 26.5–33)
MCHC RBC AUTO-ENTMCNC: 35.3 G/DL (ref 31.5–36.5)
MCV RBC AUTO: 90 FL (ref 78–100)
MUCOUS THREADS #/AREA URNS LPF: PRESENT /LPF
NITRATE UR QL: NEGATIVE
PH UR STRIP: 7.5 [PH] (ref 5–7)
PLATELET # BLD AUTO: 243 10E3/UL (ref 150–450)
POTASSIUM BLD-SCNC: 4 MMOL/L (ref 3.5–5)
RBC # BLD AUTO: 4.84 10E6/UL (ref 4.4–5.9)
RBC URINE: >182 /HPF
SODIUM SERPL-SCNC: 142 MMOL/L (ref 136–145)
SP GR UR STRIP: 1.02 (ref 1–1.03)
SQUAMOUS EPITHELIAL: <1 /HPF
UROBILINOGEN UR STRIP-MCNC: <2 MG/DL
WBC # BLD AUTO: 9 10E3/UL (ref 4–11)
WBC URINE: 9 /HPF

## 2021-11-23 PROCEDURE — 36415 COLL VENOUS BLD VENIPUNCTURE: CPT | Performed by: EMERGENCY MEDICINE

## 2021-11-23 PROCEDURE — 80048 BASIC METABOLIC PNL TOTAL CA: CPT | Performed by: EMERGENCY MEDICINE

## 2021-11-23 PROCEDURE — 258N000003 HC RX IP 258 OP 636: Performed by: EMERGENCY MEDICINE

## 2021-11-23 PROCEDURE — 96374 THER/PROPH/DIAG INJ IV PUSH: CPT

## 2021-11-23 PROCEDURE — 96375 TX/PRO/DX INJ NEW DRUG ADDON: CPT

## 2021-11-23 PROCEDURE — 96361 HYDRATE IV INFUSION ADD-ON: CPT

## 2021-11-23 PROCEDURE — 250N000011 HC RX IP 250 OP 636: Performed by: EMERGENCY MEDICINE

## 2021-11-23 PROCEDURE — 86141 C-REACTIVE PROTEIN HS: CPT | Performed by: EMERGENCY MEDICINE

## 2021-11-23 PROCEDURE — 99285 EMERGENCY DEPT VISIT HI MDM: CPT | Mod: 25

## 2021-11-23 PROCEDURE — 81001 URINALYSIS AUTO W/SCOPE: CPT | Performed by: EMERGENCY MEDICINE

## 2021-11-23 PROCEDURE — 76775 US EXAM ABDO BACK WALL LIM: CPT

## 2021-11-23 PROCEDURE — 85027 COMPLETE CBC AUTOMATED: CPT | Performed by: EMERGENCY MEDICINE

## 2021-11-23 RX ORDER — DIPHENHYDRAMINE HYDROCHLORIDE 50 MG/ML
25 INJECTION INTRAMUSCULAR; INTRAVENOUS ONCE
Status: COMPLETED | OUTPATIENT
Start: 2021-11-23 | End: 2021-11-23

## 2021-11-23 RX ORDER — MORPHINE SULFATE 4 MG/ML
4 INJECTION, SOLUTION INTRAMUSCULAR; INTRAVENOUS ONCE
Status: COMPLETED | OUTPATIENT
Start: 2021-11-23 | End: 2021-11-23

## 2021-11-23 RX ORDER — KETOROLAC TROMETHAMINE 15 MG/ML
15 INJECTION, SOLUTION INTRAMUSCULAR; INTRAVENOUS ONCE
Status: COMPLETED | OUTPATIENT
Start: 2021-11-23 | End: 2021-11-23

## 2021-11-23 RX ORDER — HYDROMORPHONE HYDROCHLORIDE 1 MG/ML
0.5 INJECTION, SOLUTION INTRAMUSCULAR; INTRAVENOUS; SUBCUTANEOUS ONCE
Status: COMPLETED | OUTPATIENT
Start: 2021-11-23 | End: 2021-11-23

## 2021-11-23 RX ORDER — ONDANSETRON 2 MG/ML
4 INJECTION INTRAMUSCULAR; INTRAVENOUS ONCE
Status: COMPLETED | OUTPATIENT
Start: 2021-11-23 | End: 2021-11-23

## 2021-11-23 RX ADMIN — HYDROMORPHONE HYDROCHLORIDE 0.5 MG: 1 INJECTION, SOLUTION INTRAMUSCULAR; INTRAVENOUS; SUBCUTANEOUS at 07:30

## 2021-11-23 RX ADMIN — SODIUM CHLORIDE 1000 ML: 9 INJECTION, SOLUTION INTRAVENOUS at 07:38

## 2021-11-23 RX ADMIN — MORPHINE SULFATE 4 MG: 4 INJECTION INTRAVENOUS at 06:58

## 2021-11-23 RX ADMIN — KETOROLAC TROMETHAMINE 15 MG: 15 INJECTION, SOLUTION INTRAMUSCULAR; INTRAVENOUS at 07:29

## 2021-11-23 RX ADMIN — DIPHENHYDRAMINE HYDROCHLORIDE 25 MG: 50 INJECTION INTRAMUSCULAR; INTRAVENOUS at 06:55

## 2021-11-23 RX ADMIN — ONDANSETRON 4 MG: 2 INJECTION INTRAMUSCULAR; INTRAVENOUS at 06:56

## 2021-11-23 NOTE — ED TRIAGE NOTES
Pt presents with left sided flank pain worsening last night with nausea. Pt recently diagnosed with 2 kidney stones. Pt passed small of two recently.

## 2021-11-23 NOTE — ED PROVIDER NOTES
EMERGENCY DEPARTMENT ENCOUNTER      NAME: Maxi Hoffman  AGE: 71 year old male  YOB: 1950  MRN: [id number]  EVALUATION DATE & TIME: 11/23/2021  6:33 AM    PCP: Storm Cobos    ED PROVIDER: Paul Guillermo M.D.      Chief Complaint   Patient presents with     Flank Pain         FINAL IMPRESSION:  Left ureteral colic      ED COURSE & MEDICAL DECISION MAKING:    Pertinent Labs & Imaging studies reviewed. (See chart for details)  71 year old male presents to the Emergency Department for reevaluation of worsening left flank pain.  Patient had presented 4 to 5 days earlier with similar symptomatology.  CT imaging indicated distal 4 mm ureteral lithiasis and proximal 5 mm ureterolithiasis on the left.  Pain worsened suddenly around 3 AM.  Patient has been nauseated with dry heaving.  Denies any fevers or chills.  On exam he is in moderate distress.  Exam significant for left CVA tenderness otherwise unremarkable.  We will proceed with symptomatic relief with intravenous Zofran, Benadryl, Toradol and morphine.  Will obtain laboratory evaluation to reassess kidney function and assess for potential infection.  Ultrasound being obtained to evaluate stone positioning.  Patient made n.p.o in anticipation of potential procedure. Patient appears non toxic with stable vitals signs    6:38 AM I met with the patient for the initial interview and physical examination. Discussed plan for treatment and workup in the ED.    7:22 AM.  C-reactive protein, CBC and basic metabolic unremarkable.  UA pending.  Patient reports pain is unchanged.  We will proceed with intravenous Dilaudid.  7:54 AM.  Ultrasound with mild left hydroureter.  Left ureteral jet noted.  No obvious stone.  Will reassess patient for potential passage of stone.  Patient reports near resolution of his discomforts consistent with likely passage of the stone.  Will continue outpatient management.  Patient has follow-up CT imaging ordered by  urology.  8:37 AM.  Urine analysis returns with obvious hematuria.  Few white cells and a few bacteria however nitrite and leukocyte negative.  No overt signs of infection.  Will await culture results  At the conclusion of the encounter I discussed the results of all of the tests and the disposition. The questions were answered and return precautions provided. The patient or family acknowledged understanding and was agreeable with the care plan.       PPE: Provider wore gloves, N95 mask, eye protection, surgical cap.     MEDICATIONS GIVEN IN THE EMERGENCY:  Medications   ketorolac (TORADOL) injection 15 mg (has no administration in time range)   ondansetron (ZOFRAN) injection 4 mg (4 mg Intravenous Given 11/23/21 0656)   diphenhydrAMINE (BENADRYL) injection 25 mg (25 mg Intravenous Given 11/23/21 0655)   morphine (PF) injection 4 mg (4 mg Intravenous Given 11/23/21 0658)       NEW PRESCRIPTIONS STARTED AT TODAY'S ER VISIT  New Prescriptions    No medications on file          =================================================================    HPI    Patient information was obtained from: Patient     Use of Intrepreter: N/A         Maxi Hoffman is a 71 year old male with a pertient medical history of kidney stones who presents to the ED for evaluation of left flank pain.     Per chart review, patient was seen in this ED on 11/17/2021 for evaluation of left flank pain. CBC unremarkable, metabolic panel did not show any creatinine elevation, CRP was normal. Urinalysis showed hematuria but no signs of infection. CT scan showed 4 mm stone at the ureterovesical junction and 5 mm stone in the proximal left ureter causing mild to moderate hydronephrosis; additional nonobstructive nephrolithiasis on the left. Discharged home with prescriptions for Dramamine, oxycodone, Tylenol, and ibuprofen. Referred to [facility] for follow-up.      Patient was seen in this ED 6 days ago and was found to have kidney stones.  He states that the medications he was given in the ED took the pain away. Patient started to develop pain again last night around 11 PM, so he took some Dramamine. Since 3 AM this morning he has had constant pain rated at 10/10 severity localized to his left flank region. He endorses associated nausea with dry heaving. No vomiting. He took oxycodone this morning with no relief. Patient notes that he has a history of kidney stones requiring a cystoscopy in the past. He denies fever, chills, or additional symptoms at this time.      REVIEW OF SYSTEMS   Constitutional:  Denies fever or chills  Respiratory:  Denies productive cough or increased work of breathing  Cardiovascular:  Denies chest pain, palpitations  GI:  Endorses nausea. Denies abdominal pain, vomiting, or change in bowel or bladder habits   : Endorses left flank pain.   Musculoskeletal:  Denies any new muscle/joint swelling  Skin:  Denies rash   Neurologic:  Denies focal weakness  All systems negative except as marked.     PAST MEDICAL HISTORY:  History reviewed. No pertinent past medical history.    PAST SURGICAL HISTORY:  Past Surgical History:   Procedure Laterality Date     COMBINED CYSTOSCOPY, INSERT STENT URETER(S) Left 1/4/2019    Procedure: CYSTOSCOPY, LEFT URETEROSCOPY LASER LITHOTRIPSY STENT INSERTION;  Surgeon: Daniel Dean MD;  Location: [facility];  Service: Urology     OTHER SURGICAL HISTORY      never had surgery         CURRENT MEDICATIONS:      Current Facility-Administered Medications:      ketorolac (TORADOL) injection 15 mg, 15 mg, Intravenous, Once, Paul Guillermo MD    Current Outpatient Medications:      acetaminophen (TYLENOL) 500 MG tablet, Take 2 tablets (1,000 mg) by mouth every 6 hours for 7 days, Disp: 56 tablet, Rfl: 0     atorvastatin (LIPITOR) 20 MG tablet, [ATORVASTATIN (LIPITOR) 20 MG TABLET] Take 20 mg by mouth., Disp: , Rfl:      celecoxib (CELEBREX) 200 MG capsule, [CELECOXIB (CELEBREX) 200 MG  CAPSULE] Take 200 mg by mouth., Disp: , Rfl:      cyclobenzaprine (FLEXERIL) 10 MG tablet, Take 1 tablet (10 mg) by mouth 3 times daily as needed for muscle spasms (Patient not taking: Reported on 11/17/2021), Disp: 20 tablet, Rfl: 0     dimenhyDRINATE (DRAMAMINE) 50 MG tablet, Take 1 tablet (50 mg) by mouth every 6 hours as needed for other (kidney stone pain management), Disp: 28 tablet, Rfl: 0     ibuprofen (ADVIL/MOTRIN) 200 MG tablet, Take 2 tablets (400 mg) by mouth every 6 hours for 7 days, Disp: 56 tablet, Rfl: 0     IBUPROFEN ORAL, [IBUPROFEN ORAL] Take 200 mg by mouth as needed.       , Disp: , Rfl:      methylPREDNISolone (MEDROL DOSEPAK) 4 MG tablet therapy pack, Follow Package Directions (Patient not taking: Reported on 11/17/2021), Disp: 21 tablet, Rfl: 0     tamsulosin (FLOMAX) 0.4 mg cap, [TAMSULOSIN (FLOMAX) 0.4 MG CAP] Take 1 capsule (0.4 mg total) by mouth daily., Disp: 90 capsule, Rfl: 3    ALLERGIES:  Allergies   Allergen Reactions     Levaquin [Levofloxacin] Itching       FAMILY HISTORY:  Family History   Problem Relation Age of Onset     Cancer Mother         breast     Diabetes Father      Arthritis Father      Urolithiasis Brother        SOCIAL HISTORY:   Social History     Socioeconomic History     Marital status:      Spouse name: None     Number of children: None     Years of education: None     Highest education level: None   Occupational History     None   Tobacco Use     Smoking status: Never Smoker     Smokeless tobacco: Never Used   Substance and Sexual Activity     Alcohol use: No     Drug use: No     Sexual activity: None   Other Topics Concern     None   Social History Narrative     None     Social Determinants of Health     Financial Resource Strain: Not on file   Food Insecurity: Not on file   Transportation Needs: Not on file   Physical Activity: Not on file   Stress: Not on file   Social Connections: Not on file   Intimate Partner Violence: Not on file   Housing  Stability: Not on file       VITALS:  Patient Vitals for the past 24 hrs:   BP Temp Temp src Pulse Resp SpO2 Weight   11/23/21 0634 (!) 201/93 97.7  F (36.5  C) Oral 67 22 99 % 78.5 kg (173 lb)        PHYSICAL EXAM    Constitutional:  Awake, alert, in moderate distress  HENT:  Normocephalic, Atraumatic. Bilateral external ears normal. Oropharynx moist. Nose normal. Neck- Normal range of motion with no guarding, No midline cervical tenderness, Supple, No stridor.   Eyes:  PERRL, EOMI with no signs of entrapment, Conjunctiva normal, No discharge.   Respiratory:  Normal breath sounds, No respiratory distress, No wheezing.    Cardiovascular:  Normal heart rate, Normal rhythm, No appreciable rubs or gallops.   GI:  Soft, No tenderness, No distension, No palpable masses  Back: Left CVA tenderness  Musculoskeletal:   No edema. Good range of motion in all major joints.   Integument:  Warm, Dry, No erythema, No rash.   Neurologic:  Alert & oriented, Normal motor function, Normal sensory function, No focal deficits noted.   Psychiatric:  Affect normal, Judgment normal, Mood normal.     LAB:  All pertinent labs reviewed and interpreted.  Results for orders placed or performed during the hospital encounter of 11/23/21   US Kidney Left     Status: None (Preliminary result)    Narrative    EXAM: US KIDNEY LEFT  LOCATION: [facility]  DATE/TIME: 11/23/2021 7:05 AM    INDICATION: Renal colic.  COMPARISON: CT abdomen and pelvis, 11/17/2021.  TECHNIQUE: Routine Left Renal and Bladder Ultrasound.    FINDINGS:    LEFT KIDNEY: 12.4 x 4.8 x 5.8 cm. There is mild to moderate left hydronephrosis, similar to recent CT scan. 4 mm calcification is present within the midportion of the left kidney. The previously seen stone involving the left ureteropelvic junction is not   identified on this examination.     BLADDER: Normal. Left-sided ureteral jet is noted.      Impression    IMPRESSION:    1. Persistent mild to  moderate left hydronephrosis. A 4 mm stone is present within the mid left kidney. The previously seen stone within the left ureteropelvic junction is not identified on this examination.   Basic metabolic panel     Status: Abnormal   Result Value Ref Range    Sodium 142 136 - 145 mmol/L    Potassium 4.0 3.5 - 5.0 mmol/L    Chloride 108 (H) 98 - 107 mmol/L    Carbon Dioxide (CO2) 25 22 - 31 mmol/L    Anion Gap 9 5 - 18 mmol/L    Urea Nitrogen 19 8 - 28 mg/dL    Creatinine 1.24 0.70 - 1.30 mg/dL    Calcium 10.5 8.5 - 10.5 mg/dL    Glucose 123 70 - 125 mg/dL    GFR Estimate 59 (L) >60 mL/min/1.73m2   CBC (+ platelets, no diff)     Status: Normal   Result Value Ref Range    WBC Count 9.0 4.0 - 11.0 10e3/uL    RBC Count 4.84 4.40 - 5.90 10e6/uL    Hemoglobin 15.3 13.3 - 17.7 g/dL    Hematocrit 43.3 40.0 - 53.0 %    MCV 90 78 - 100 fL    MCH 31.6 26.5 - 33.0 pg    MCHC 35.3 31.5 - 36.5 g/dL    RDW 13.4 10.0 - 15.0 %    Platelet Count 243 150 - 450 10e3/uL   CRP inflammation     Status: Normal   Result Value Ref Range    CRP 0.4 0.0-<0.8 mg/dL         I, Janell Pete, am serving as a scribe to document services personally performed by Paul Guillermo MD, based on my observation and the provider's statements to me. I, Paul Guillermo MD attest that Janell Pete is acting in a scribe capacity, has observed my performance of the services and has documented them in accordance with my direction.    Paul Guillermo M.D.  Emergency Medicine  [facility] EMERGENCY ROOM     Paul Guillermo MD  11/23/21 0801       Paul Guillermo MD  11/23/21 0837

## 2021-11-25 ENCOUNTER — HOSPITAL ENCOUNTER (EMERGENCY)
Facility: CLINIC | Age: 71
Discharge: HOME | End: 2021-11-26
Attending: EMERGENCY MEDICINE | Admitting: EMERGENCY MEDICINE
Payer: COMMERCIAL

## 2021-11-25 ENCOUNTER — APPOINTMENT (OUTPATIENT)
Dept: CT IMAGING | Facility: CLINIC | Age: 71
End: 2021-11-25
Attending: EMERGENCY MEDICINE
Payer: COMMERCIAL

## 2021-11-25 DIAGNOSIS — N20.1 URETEROLITHIASIS: ICD-10-CM

## 2021-11-25 LAB
ALBUMIN UR-MCNC: NEGATIVE MG/DL
ANION GAP SERPL CALCULATED.3IONS-SCNC: 12 MMOL/L (ref 5–18)
APPEARANCE UR: CLEAR
BASOPHILS # BLD AUTO: 0 10E3/UL (ref 0–0.2)
BASOPHILS NFR BLD AUTO: 0 %
BILIRUB UR QL STRIP: NEGATIVE
BUN SERPL-MCNC: 25 MG/DL (ref 8–28)
CALCIUM SERPL-MCNC: 9.7 MG/DL (ref 8.5–10.5)
CHLORIDE BLD-SCNC: 103 MMOL/L (ref 98–107)
CO2 SERPL-SCNC: 26 MMOL/L (ref 22–31)
COLOR UR AUTO: ABNORMAL
CREAT SERPL-MCNC: 1.86 MG/DL (ref 0.7–1.3)
EOSINOPHIL # BLD AUTO: 0.1 10E3/UL (ref 0–0.7)
EOSINOPHIL NFR BLD AUTO: 1 %
ERYTHROCYTE [DISTWIDTH] IN BLOOD BY AUTOMATED COUNT: 13.2 % (ref 10–15)
GFR SERPL CREATININE-BSD FRML MDRD: 36 ML/MIN/1.73M2
GLUCOSE BLD-MCNC: 110 MG/DL (ref 70–125)
GLUCOSE UR STRIP-MCNC: NEGATIVE MG/DL
HCT VFR BLD AUTO: 42.9 % (ref 40–53)
HGB BLD-MCNC: 14.1 G/DL (ref 13.3–17.7)
HGB UR QL STRIP: ABNORMAL
IMM GRANULOCYTES # BLD: 0 10E3/UL
IMM GRANULOCYTES NFR BLD: 0 %
KETONES UR STRIP-MCNC: NEGATIVE MG/DL
LEUKOCYTE ESTERASE UR QL STRIP: NEGATIVE
LYMPHOCYTES # BLD AUTO: 1.6 10E3/UL (ref 0.8–5.3)
LYMPHOCYTES NFR BLD AUTO: 26 %
MCH RBC QN AUTO: 30.5 PG (ref 26.5–33)
MCHC RBC AUTO-ENTMCNC: 32.9 G/DL (ref 31.5–36.5)
MCV RBC AUTO: 93 FL (ref 78–100)
MONOCYTES # BLD AUTO: 0.7 10E3/UL (ref 0–1.3)
MONOCYTES NFR BLD AUTO: 11 %
MUCOUS THREADS #/AREA URNS LPF: PRESENT /LPF
NEUTROPHILS # BLD AUTO: 3.7 10E3/UL (ref 1.6–8.3)
NEUTROPHILS NFR BLD AUTO: 62 %
NITRATE UR QL: NEGATIVE
NRBC # BLD AUTO: 0 10E3/UL
NRBC BLD AUTO-RTO: 0 /100
PH UR STRIP: 6 [PH] (ref 5–7)
PLATELET # BLD AUTO: 249 10E3/UL (ref 150–450)
POTASSIUM BLD-SCNC: 4.3 MMOL/L (ref 3.5–5)
RBC # BLD AUTO: 4.63 10E6/UL (ref 4.4–5.9)
RBC URINE: 48 /HPF
SODIUM SERPL-SCNC: 141 MMOL/L (ref 136–145)
SP GR UR STRIP: 1.03 (ref 1–1.03)
SQUAMOUS EPITHELIAL: <1 /HPF
UROBILINOGEN UR STRIP-MCNC: 2 MG/DL
WBC # BLD AUTO: 6.1 10E3/UL (ref 4–11)
WBC URINE: 1 /HPF

## 2021-11-25 PROCEDURE — 85025 COMPLETE CBC W/AUTO DIFF WBC: CPT | Performed by: EMERGENCY MEDICINE

## 2021-11-25 PROCEDURE — 96374 THER/PROPH/DIAG INJ IV PUSH: CPT

## 2021-11-25 PROCEDURE — 80048 BASIC METABOLIC PNL TOTAL CA: CPT | Performed by: EMERGENCY MEDICINE

## 2021-11-25 PROCEDURE — 96376 TX/PRO/DX INJ SAME DRUG ADON: CPT

## 2021-11-25 PROCEDURE — 81001 URINALYSIS AUTO W/SCOPE: CPT | Performed by: EMERGENCY MEDICINE

## 2021-11-25 PROCEDURE — 250N000011 HC RX IP 250 OP 636: Performed by: EMERGENCY MEDICINE

## 2021-11-25 PROCEDURE — 258N000003 HC RX IP 258 OP 636: Performed by: EMERGENCY MEDICINE

## 2021-11-25 PROCEDURE — 96361 HYDRATE IV INFUSION ADD-ON: CPT

## 2021-11-25 PROCEDURE — 99285 EMERGENCY DEPT VISIT HI MDM: CPT | Mod: 25

## 2021-11-25 PROCEDURE — 36415 COLL VENOUS BLD VENIPUNCTURE: CPT | Performed by: EMERGENCY MEDICINE

## 2021-11-25 PROCEDURE — 74176 CT ABD & PELVIS W/O CONTRAST: CPT

## 2021-11-25 PROCEDURE — 96375 TX/PRO/DX INJ NEW DRUG ADDON: CPT

## 2021-11-25 RX ORDER — KETOROLAC TROMETHAMINE 15 MG/ML
15 INJECTION, SOLUTION INTRAMUSCULAR; INTRAVENOUS ONCE
Status: COMPLETED | OUTPATIENT
Start: 2021-11-25 | End: 2021-11-25

## 2021-11-25 RX ORDER — DIPHENHYDRAMINE HYDROCHLORIDE 50 MG/ML
25 INJECTION INTRAMUSCULAR; INTRAVENOUS ONCE
Status: COMPLETED | OUTPATIENT
Start: 2021-11-25 | End: 2021-11-25

## 2021-11-25 RX ORDER — MORPHINE SULFATE 4 MG/ML
4 INJECTION, SOLUTION INTRAMUSCULAR; INTRAVENOUS ONCE
Status: COMPLETED | OUTPATIENT
Start: 2021-11-25 | End: 2021-11-25

## 2021-11-25 RX ORDER — HYDROMORPHONE HYDROCHLORIDE 1 MG/ML
0.5 INJECTION, SOLUTION INTRAMUSCULAR; INTRAVENOUS; SUBCUTANEOUS
Status: DISCONTINUED | OUTPATIENT
Start: 2021-11-25 | End: 2021-11-26 | Stop reason: HOSPADM

## 2021-11-25 RX ORDER — KETOROLAC TROMETHAMINE 15 MG/ML
15 INJECTION, SOLUTION INTRAMUSCULAR; INTRAVENOUS ONCE
Status: DISCONTINUED | OUTPATIENT
Start: 2021-11-25 | End: 2021-11-25

## 2021-11-25 RX ORDER — ONDANSETRON 4 MG/1
4 TABLET, ORALLY DISINTEGRATING ORAL ONCE
Status: COMPLETED | OUTPATIENT
Start: 2021-11-25 | End: 2021-11-25

## 2021-11-25 RX ADMIN — HYDROMORPHONE HYDROCHLORIDE 0.5 MG: 1 INJECTION, SOLUTION INTRAMUSCULAR; INTRAVENOUS; SUBCUTANEOUS at 22:34

## 2021-11-25 RX ADMIN — SODIUM CHLORIDE 1000 ML: 9 INJECTION, SOLUTION INTRAVENOUS at 21:52

## 2021-11-25 RX ADMIN — ONDANSETRON 4 MG: 4 TABLET, ORALLY DISINTEGRATING ORAL at 19:51

## 2021-11-25 RX ADMIN — DIPHENHYDRAMINE HYDROCHLORIDE 25 MG: 50 INJECTION INTRAMUSCULAR; INTRAVENOUS at 21:36

## 2021-11-25 RX ADMIN — MORPHINE SULFATE 4 MG: 4 INJECTION INTRAVENOUS at 20:40

## 2021-11-25 RX ADMIN — KETOROLAC TROMETHAMINE 15 MG: 15 INJECTION, SOLUTION INTRAMUSCULAR; INTRAVENOUS at 23:29

## 2021-11-25 RX ADMIN — MORPHINE SULFATE 4 MG: 4 INJECTION INTRAVENOUS at 21:36

## 2021-11-26 VITALS
HEART RATE: 67 BPM | DIASTOLIC BLOOD PRESSURE: 94 MMHG | RESPIRATION RATE: 20 BRPM | BODY MASS INDEX: 24.13 KG/M2 | WEIGHT: 173 LBS | TEMPERATURE: 98 F | OXYGEN SATURATION: 98 % | SYSTOLIC BLOOD PRESSURE: 200 MMHG

## 2021-11-26 NOTE — ED PROVIDER NOTES
ED SIGNOUT  Date/Time:11/25/2021 10:00 PM    Patient signed out to me by my colleague, Dr. Guillermo.  Please see their note for complete history and physical. Plan to follow up on CT Abdomen Pelvis and disposition.    The creation of this record is based on the scribe s observations of the work being performed by Whitney Robles and the provider s statements to them. It was created on their behalf by Whitney Robles a trained medical scribe. This document has been checked and approved by the attending provider.      REMAINING ED WORKUP:    Vitals:  BP (!) 219/93   Pulse 72   Temp 97.9  F (36.6  C) (Oral)   Resp 20   Wt 78.5 kg (173 lb)   SpO2 98%   BMI 24.13 kg/m        Pertinent labs results reviewed   Results for orders placed or performed during the hospital encounter of 11/25/21   Abd/pelvis CT no contrast - Stone Protocol    Impression    IMPRESSION:   1.  Previously seen left proximal ureteral stone has migrated into the distal left ureter. There is slight interval increase of left hydronephrosis. The prior left ureterovesicular junction stone has passed in the interval.     UA with Microscopic reflex to Culture    Specimen: Urine, Clean Catch   Result Value Ref Range    Color Urine Light Yellow Colorless, Straw, Light Yellow, Yellow    Appearance Urine Clear Clear    Glucose Urine Negative Negative mg/dL    Bilirubin Urine Negative Negative    Ketones Urine Negative Negative mg/dL    Specific Gravity Urine 1.026 1.001 - 1.030    Blood Urine 0.2 mg/dL (A) Negative    pH Urine 6.0 5.0 - 7.0    Protein Albumin Urine Negative Negative mg/dL    Urobilinogen Urine 2.0 (A) <2.0 mg/dL    Nitrite Urine Negative Negative    Leukocyte Esterase Urine Negative Negative    Mucus Urine Present (A) None Seen /LPF    RBC Urine 48 (H) <=2 /HPF    WBC Urine 1 <=5 /HPF    Squamous Epithelials Urine <1 <=1 /HPF   Basic metabolic panel   Result Value Ref Range    Sodium 141 136 - 145 mmol/L    Potassium 4.3 3.5 - 5.0 mmol/L    Chloride  103 98 - 107 mmol/L    Carbon Dioxide (CO2) 26 22 - 31 mmol/L    Anion Gap 12 5 - 18 mmol/L    Urea Nitrogen 25 8 - 28 mg/dL    Creatinine 1.86 (H) 0.70 - 1.30 mg/dL    Calcium 9.7 8.5 - 10.5 mg/dL    Glucose 110 70 - 125 mg/dL    GFR Estimate 36 (L) >60 mL/min/1.73m2   CBC with platelets and differential   Result Value Ref Range    WBC Count 6.1 4.0 - 11.0 10e3/uL    RBC Count 4.63 4.40 - 5.90 10e6/uL    Hemoglobin 14.1 13.3 - 17.7 g/dL    Hematocrit 42.9 40.0 - 53.0 %    MCV 93 78 - 100 fL    MCH 30.5 26.5 - 33.0 pg    MCHC 32.9 31.5 - 36.5 g/dL    RDW 13.2 10.0 - 15.0 %    Platelet Count 249 150 - 450 10e3/uL    % Neutrophils 62 %    % Lymphocytes 26 %    % Monocytes 11 %    % Eosinophils 1 %    % Basophils 0 %    % Immature Granulocytes 0 %    NRBCs per 100 WBC 0 <1 /100    Absolute Neutrophils 3.7 1.6 - 8.3 10e3/uL    Absolute Lymphocytes 1.6 0.8 - 5.3 10e3/uL    Absolute Monocytes 0.7 0.0 - 1.3 10e3/uL    Absolute Eosinophils 0.1 0.0 - 0.7 10e3/uL    Absolute Basophils 0.0 0.0 - 0.2 10e3/uL    Absolute Immature Granulocytes 0.0 <=0.0 10e3/uL    Absolute NRBCs 0.0 10e3/uL       Pertinent imaging reviewed   Please see official radiology report.  Abd/pelvis CT no contrast - Stone Protocol   Final Result   IMPRESSION:    1.  Previously seen left proximal ureteral stone has migrated into the distal left ureter. There is slight interval increase of left hydronephrosis. The prior left ureterovesicular junction stone has passed in the interval.              Interventions  Medications   HYDROmorphone (PF) (DILAUDID) injection 0.5 mg (0.5 mg Intravenous Given 11/25/21 2234)   ondansetron (ZOFRAN-ODT) ODT tab 4 mg (4 mg Oral Given 11/1950)   morphine (PF) injection 4 mg (4 mg Intravenous Given 11/25/21 2040)   diphenhydrAMINE (BENADRYL) injection 25 mg (25 mg Intravenous Given 11/25/21 2136)   morphine (PF) injection 4 mg (4 mg Intravenous Given 11/25/21 2136)   0.9% sodium chloride BOLUS (1,000 mLs Intravenous New  Bag 11/25/21 2152)   ketorolac (TORADOL) injection 15 mg (15 mg Intravenous Given 11/25/21 4795)        ED Course/MDM:  10:00 PM Signout accepted from Dr. Guillermo.  Prior records were reviewed.  Diagnostics from this visit are reviewed.  10:56 PM I introduced myself to the patient.  11:01 PM I spoke to Dr. Gilbert from [facility].    Patient received IV Dilaudid and IV Toradol with improvement of his pain.  Discussed with Dr. Gilbert from [facility].  Likely stones of passing stone.  Given patient's feeling better will discharge home.  He will continue his home medications.  Follow-up with [facility].  Return for worsening symptoms           1. Ureterolithiasis          Jerald Keller M.D.  Emergency Medicine  [facility] EMERGENCY ROOM  [address] [place] [id number] [phone]  Dept: [phone]       Jerald Keller MD  11/26/21 0024

## 2021-11-26 NOTE — ED PROVIDER NOTES
EMERGENCY DEPARTMENT ENCOUNTER      NAME: Maxi Hoffman  AGE: 71 year old male  YOB: 1950  MRN: [id number]  EVALUATION DATE & TIME: 11/25/2021  8:23 PM    PCP: Storm Cobos    ED PROVIDER: Paul Guillermo M.D.      Chief Complaint   Patient presents with     Flank Pain         FINAL IMPRESSION:  Left renal colic  Hematuria  Acute kidney injury    ED COURSE & MEDICAL DECISION MAKING:    Pertinent Labs & Imaging studies reviewed. (See chart for details)  71 year old male presents to the Emergency Department for evaluation of current left flank pain.  Patient has been seen twice in the last week for similar symptomatology.  Initial CT revealed a distal left UVJ stone in the mid left larger stone.  He was seen a few days later with ultrasound revealing mild hydro but good left ureteral jet and resolution of symptoms thereafter.  Patient with recurrent symptomatology today.  Reports severe left flank pain with nausea and vomiting consistent with renal colic once again.  Will treat symptomatically.  Urine analysis be obtained assess for infectious process.  CT imaging for reevaluation.. Patient appears non toxic with stable vitals signs.     8:25 PM I met with the patient for the initial interview and physical examination. Discussed plan for treatment and workup in the ED.    9:20 PM.  Patient with minimal improvement with intravenous morphine.  9:44 PM.  Urinalysis with hematuria but no evidence of infection.  White cell count normal.  Creatinine 1.86 has increased from 1.24.  Venous normal saline ordered.  10:05 PM.  Patient discussed with Dr. Ferguson at end of shift.  He will determine disposition guided by CT findings.      At the conclusion of the encounter I discussed the results of all of the tests and the disposition. The questions were answered and return precautions provided. The patient or family acknowledged understanding and was agreeable with the care plan.       PPE: Provider wore N95 mask,  eye protection, surgical cap.    MEDICATIONS GIVEN IN THE EMERGENCY:  Medications   diphenhydrAMINE (BENADRYL) injection 25 mg (has no administration in time range)   morphine (PF) injection 4 mg (has no administration in time range)   ondansetron (ZOFRAN-ODT) ODT tab 4 mg (4 mg Oral Given 11/1950)   morphine (PF) injection 4 mg (4 mg Intravenous Given 11/25/21 2040)       NEW PRESCRIPTIONS STARTED AT TODAY'S ER VISIT  New Prescriptions    No medications on file          =================================================================    HPI    Patient information was obtained from: patient    Use of Intrepreter: N/A       Maxi Hoffman is a 71 year old male with a pertient medical history of kidney stones s/p cystoscopy in 2019 who presents to the ED for evaluation of flank pain.    Per chart review, patient initially presented to [facility] ED on 11/17/2021 (8 days ago) with a one day history of left sided flank pain. CT A/P demonstrated a 4 mm stone at the ureterovesical junction and 5 mm stone to the proximal left ureter with mild to moderate hydronephrosis as well as nonobstructive nephrolithiasis on the left. UA with hematuria though no evidence of infection. Symptoms improved with analgesics and patient discharged with prescription for Oxycodone and Dramamine. Despite this, patient's pain persisted and worsened with associated nausea. He returned to the ED on 11/23/2021 (2 days ago) where US showed persistent mild to moderate left hydronephrosis and 4 mm stone within the mid left kidney, with previous left ureteropelvic junction stone unidentifiable. Labs were again unremarkable aside from obvious hematuria in UA. Patient's symptoms improved with IV Dilaudid and he was discharged home with plan for outpatient Urology follow up.     Regarding his current presentation, patient reports symptomatic improvement after his most recent discharge. He woke this morning with mild 3/10 pain and this was  manageable for the majority of the day. However, after waking from a nap around 1700 (~3 hours ago) he developed 6/10 left flank pain with associated nausea and one episode of emesis. Denies hematuria. His pain is exacerbated with position changes.       REVIEW OF SYSTEMS   Constitutional:  Denies fever, chills  Respiratory:  Denies productive cough or increased work of breathing  Cardiovascular:  Denies chest pain, palpitations  GI: Endorses left flank pain, nausea, and vomiting. Denies change in bowel or bladder habits   Musculoskeletal:  Denies any new muscle/joint swelling  Skin:  Denies rash   Neurologic:  Denies focal weakness  All systems negative except as marked.     PAST MEDICAL HISTORY:  No past medical history on file.    PAST SURGICAL HISTORY:  Past Surgical History:   Procedure Laterality Date     COMBINED CYSTOSCOPY, INSERT STENT URETER(S) Left 1/4/2019    Procedure: CYSTOSCOPY, LEFT URETEROSCOPY LASER LITHOTRIPSY STENT INSERTION;  Surgeon: Daniel Dean MD;  Location: [facility];  Service: Urology     OTHER SURGICAL HISTORY      never had surgery         CURRENT MEDICATIONS:      Current Facility-Administered Medications:      diphenhydrAMINE (BENADRYL) injection 25 mg, 25 mg, Intravenous, Once, Paul Guillermo MD     morphine (PF) injection 4 mg, 4 mg, Intravenous, Once, Paul Guillermo MD    Current Outpatient Medications:      atorvastatin (LIPITOR) 20 MG tablet, [ATORVASTATIN (LIPITOR) 20 MG TABLET] Take 20 mg by mouth., Disp: , Rfl:      celecoxib (CELEBREX) 200 MG capsule, [CELECOXIB (CELEBREX) 200 MG CAPSULE] Take 200 mg by mouth., Disp: , Rfl:      cyclobenzaprine (FLEXERIL) 10 MG tablet, Take 1 tablet (10 mg) by mouth 3 times daily as needed for muscle spasms (Patient not taking: Reported on 11/17/2021), Disp: 20 tablet, Rfl: 0     IBUPROFEN ORAL, [IBUPROFEN ORAL] Take 200 mg by mouth as needed.       , Disp: , Rfl:      methylPREDNISolone (MEDROL DOSEPAK) 4 MG tablet  therapy pack, Follow Package Directions (Patient not taking: Reported on 11/17/2021), Disp: 21 tablet, Rfl: 0     tamsulosin (FLOMAX) 0.4 mg cap, [TAMSULOSIN (FLOMAX) 0.4 MG CAP] Take 1 capsule (0.4 mg total) by mouth daily., Disp: 90 capsule, Rfl: 3    ALLERGIES:  Allergies   Allergen Reactions     Levaquin [Levofloxacin] Itching       FAMILY HISTORY:  Family History   Problem Relation Age of Onset     Cancer Mother         breast     Diabetes Father      Arthritis Father      Urolithiasis Brother        SOCIAL HISTORY:   Social History     Socioeconomic History     Marital status:      Spouse name: Not on file     Number of children: Not on file     Years of education: Not on file     Highest education level: Not on file   Occupational History     Not on file   Tobacco Use     Smoking status: Never Smoker     Smokeless tobacco: Never Used   Substance and Sexual Activity     Alcohol use: No     Drug use: No     Sexual activity: Not on file   Other Topics Concern     Not on file   Social History Narrative     Not on file     Social Determinants of Health     Financial Resource Strain: Not on file   Food Insecurity: Not on file   Transportation Needs: Not on file   Physical Activity: Not on file   Stress: Not on file   Social Connections: Not on file   Intimate Partner Violence: Not on file   Housing Stability: Not on file       VITALS:  Patient Vitals for the past 24 hrs:   BP Temp Temp src Pulse Resp SpO2 Weight   11/25/21 1941 (!) 212/104 97.9  F (36.6  C) Oral 85 20 99 % 78.5 kg (173 lb)        PHYSICAL EXAM    Constitutional:  Awake, alert, in no mild to moderate distress  HENT:  Normocephalic, Atraumatic. Bilateral external ears normal. Oropharynx moist. Nose normal. Neck- Normal range of motion with no guarding, No midline cervical tenderness, Supple, No stridor.   Eyes:  PERRL, EOMI with no signs of entrapment, Conjunctiva normal, No discharge.   Respiratory:  Normal breath sounds, No respiratory  distress, No wheezing.    Cardiovascular:  Normal heart rate, Normal rhythm, No appreciable rubs or gallops.   GI:  Soft, Left CVA tenderness, No distension, No palpable masses  Musculoskeletal:  Intact distal pulses, No edema. Good range of motion in all major joints. No tenderness to palpation or major deformities noted.  Integument:  Warm, Dry, No erythema, No rash.   Neurologic:  Alert & oriented, Normal motor function, Normal sensory function, No focal deficits noted.   Psychiatric:  Affect normal, Judgment normal, Mood normal.     LAB:  All pertinent labs reviewed and interpreted.  Results for orders placed or performed during the hospital encounter of 11/25/21   UA with Microscopic reflex to Culture    Specimen: Urine, Clean Catch   Result Value Ref Range    Color Urine Light Yellow Colorless, Straw, Light Yellow, Yellow    Appearance Urine Clear Clear    Glucose Urine Negative Negative mg/dL    Bilirubin Urine Negative Negative    Ketones Urine Negative Negative mg/dL    Specific Gravity Urine 1.026 1.001 - 1.030    Blood Urine 0.2 mg/dL (A) Negative    pH Urine 6.0 5.0 - 7.0    Protein Albumin Urine Negative Negative mg/dL    Urobilinogen Urine 2.0 (A) <2.0 mg/dL    Nitrite Urine Negative Negative    Leukocyte Esterase Urine Negative Negative    Mucus Urine Present (A) None Seen /LPF    RBC Urine 48 (H) <=2 /HPF    WBC Urine 1 <=5 /HPF    Squamous Epithelials Urine <1 <=1 /HPF   Basic metabolic panel   Result Value Ref Range    Sodium 141 136 - 145 mmol/L    Potassium 4.3 3.5 - 5.0 mmol/L    Chloride 103 98 - 107 mmol/L    Carbon Dioxide (CO2) 26 22 - 31 mmol/L    Anion Gap 12 5 - 18 mmol/L    Urea Nitrogen 25 8 - 28 mg/dL    Creatinine 1.86 (H) 0.70 - 1.30 mg/dL    Calcium 9.7 8.5 - 10.5 mg/dL    Glucose 110 70 - 125 mg/dL    GFR Estimate 36 (L) >60 mL/min/1.73m2   CBC with platelets and differential   Result Value Ref Range    WBC Count 6.1 4.0 - 11.0 10e3/uL    RBC Count 4.63 4.40 - 5.90 10e6/uL     Hemoglobin 14.1 13.3 - 17.7 g/dL    Hematocrit 42.9 40.0 - 53.0 %    MCV 93 78 - 100 fL    MCH 30.5 26.5 - 33.0 pg    MCHC 32.9 31.5 - 36.5 g/dL    RDW 13.2 10.0 - 15.0 %    Platelet Count 249 150 - 450 10e3/uL    % Neutrophils 62 %    % Lymphocytes 26 %    % Monocytes 11 %    % Eosinophils 1 %    % Basophils 0 %    % Immature Granulocytes 0 %    NRBCs per 100 WBC 0 <1 /100    Absolute Neutrophils 3.7 1.6 - 8.3 10e3/uL    Absolute Lymphocytes 1.6 0.8 - 5.3 10e3/uL    Absolute Monocytes 0.7 0.0 - 1.3 10e3/uL    Absolute Eosinophils 0.1 0.0 - 0.7 10e3/uL    Absolute Basophils 0.0 0.0 - 0.2 10e3/uL    Absolute Immature Granulocytes 0.0 <=0.0 10e3/uL    Absolute NRBCs 0.0 10e3/uL       RADIOLOGY:  Reviewed all pertinent imaging. Please see official radiology report.  Abd/pelvis CT no contrast - Stone Protocol    (Results Pending)         I, Massiel Ba, am serving as a scribe to document services personally performed by Paul Guillermo MD, based on my observation and the provider's statements to me. I, Paul Guillermo MD attest that Massiel Ba is acting in a scribe capacity, has observed my performance of the services and has documented them in accordance with my direction.    Paul Guillermo M.D.  Emergency Medicine  [facility] EMERGENCY ROOM      Paul Guillermo MD  11/25/21 4641

## 2021-11-26 NOTE — ED TRIAGE NOTES
Left side flank pain. Was seen here Tuesday and diagnosed with kidney stones. Reports passing one but continues to have pain. Took dramamine and tylenol around 1700.

## 2021-12-01 ENCOUNTER — HOSPITAL ENCOUNTER (OUTPATIENT)
Dept: CT IMAGING | Facility: CLINIC | Age: 71
Discharge: HOME | End: 2021-12-01
Attending: UROLOGY | Admitting: UROLOGY
Payer: COMMERCIAL

## 2021-12-01 DIAGNOSIS — N20.1 CALCULUS OF URETER: ICD-10-CM

## 2021-12-01 PROCEDURE — 74176 CT ABD & PELVIS W/O CONTRAST: CPT

## 2021-12-02 ENCOUNTER — VIRTUAL VISIT (OUTPATIENT)
Dept: UROLOGY | Facility: CLINIC | Age: 71
End: 2021-12-02
Payer: COMMERCIAL

## 2021-12-02 DIAGNOSIS — N20.1 CALCULUS OF URETER: Primary | ICD-10-CM

## 2021-12-02 PROCEDURE — 99214 OFFICE O/P EST MOD 30 MIN: CPT | Mod: GT | Performed by: UROLOGY

## 2021-12-02 ASSESSMENT — PAIN SCALES - GENERAL: PAINLEVEL: MILD PAIN (2)

## 2021-12-02 NOTE — PROGRESS NOTES
Assessment/Plan:    Assessment & Plan   Maxi was seen today for follow up.    Diagnoses and all orders for this visit:    Calculus of ureter  -     Patient Stated Goal: Pass my stone  -     CT Abdomen Pelvis w/o Contrast; Future        Stone Management Plan  Stone Management 11/17/2021 12/2/2021   Urinary Tract Infection No suspicion of infection No suspicion of infection   Renal Colic Well controlled symptoms Asymptomatic at this time   Renal Failure No suspicion of renal failure No suspicion of renal failure   Current CT date 11/17/2021 12/1/2021   Right sided stones? No No   R Stone Event No current event No current event   Left sided stones? Yes Yes   L Number of ureteral stones 2 1   L GSD of ureteral stones 5 5   L Location of ureteral stone Distal Mid   L Number of kidney stones  No renal stones No renal stones   L Hydronephrosis Mild None   L Stone Event New event Established event   Diagnosis date 11/17/2021 -   Initial location of primary symptomatic stone Distal -   Initial GSD of primary symptomatic stone 5 -   L MET Status Initiation Progression   L Current Plan MET MET   MET 2 week F/U 2 week F/U             PLAN    Video call duration: 10 minutes  15 minutes spent on the date of the encounter doing chart review, history and exam, documentation and further activities per the note    DANTE KAHN MD  [facility]    HPI  Mr. Maxi Hoffman is a 71 year old  male who is being evaluated via a billable video visit by [facility] [place] for medical expulsive therapy follow up.     On last encounter, his 5 mm stone was in left proximal and left distal ureter with Mild hydronephrosis. He has had to visit the Emergency Department for pain on two occasions.    Symptoms have been well controlled with medication and he is able to carry on normal activities. He is asymptomatic at present. He denies symptoms of fever, chills, flank pain,  nausea, vomiting, urinary frequency and dysuria. He did catch one stone.    New CT scan was personally reviewed and demonstrates progression of the proximal stone to the mid ureter and confirms passage of the distal stone with no hydronephrosis.     He will continue to attempt to pass stone and will return in 2 weeks with further imaging. He would like to give it another couple of weeks since he passed the first stone. His creatinine had increased to 1.86 at the time of his second ED encounter and we discussed importance of minimizing NSAIDs.    ROS   Review of systems is negative except for HPI.    No past medical history on file.    Past Surgical History:   Procedure Laterality Date     COMBINED CYSTOSCOPY, INSERT STENT URETER(S) Left 1/4/2019    Procedure: CYSTOSCOPY, LEFT URETEROSCOPY LASER LITHOTRIPSY STENT INSERTION;  Surgeon: Daniel Dean MD;  Location: [facility] [place];  Service: Urology     OTHER SURGICAL HISTORY      never had surgery       Current Outpatient Medications   Medication Sig Dispense Refill     atorvastatin (LIPITOR) 20 MG tablet [ATORVASTATIN (LIPITOR) 20 MG TABLET] Take 20 mg by mouth.       celecoxib (CELEBREX) 200 MG capsule [CELECOXIB (CELEBREX) 200 MG CAPSULE] Take 200 mg by mouth.       IBUPROFEN ORAL [IBUPROFEN ORAL] Take 200 mg by mouth as needed.              tamsulosin (FLOMAX) 0.4 mg cap [TAMSULOSIN (FLOMAX) 0.4 MG CAP] Take 1 capsule (0.4 mg total) by mouth daily. 90 capsule 3       Allergies   Allergen Reactions     Levaquin [Levofloxacin] Itching       Social History     Socioeconomic History     Marital status:      Spouse name: Not on file     Number of children: Not on file     Years of education: Not on file     Highest education level: Not on file   Occupational History     Not on file   Tobacco Use     Smoking status: Never Smoker     Smokeless tobacco: Never Used   Substance and Sexual Activity     Alcohol use: No     Drug use: No     Sexual activity:  Not on file   Other Topics Concern     Not on file   Social History Narrative     Not on file     Social Determinants of Health     Financial Resource Strain: Not on file   Food Insecurity: Not on file   Transportation Needs: Not on file   Physical Activity: Not on file   Stress: Not on file   Social Connections: Not on file   Intimate Partner Violence: Not on file   Housing Stability: Not on file       Family History   Problem Relation Age of Onset     Cancer Mother         breast     Diabetes Father      Arthritis Father      Urolithiasis Brother        Objective:     Appears AAO x 3  No vitals obtained due to virtual visit    Labs   Most Recent 3 CBC's:Recent Labs   Lab Test 11/25/21 1949 11/23/21  0655 11/17/21  0123   WBC 6.1 9.0 9.4   HGB 14.1 15.3 15.5   MCV 93 90 91    243 217     Most Recent 3 BMP's:Recent Labs   Lab Test 11/25/21 1949 11/23/21  0655 11/17/21  0123    142 137   POTASSIUM 4.3 4.0 3.8   CHLORIDE 103 108* 103   CO2 26 25 26   BUN 25 19 22   CR 1.86* 1.24 1.23   ANIONGAP 12 9 8   SPARKLE 9.7 10.5 10.8*    123 161*     Most Recent Urinalysis:Recent Labs   Lab Test 11/25/21 2043 07/19/21  0953 02/13/19  1048   COLOR Light Yellow   < > Yellow   APPEARANCE Clear   < > Clear   URINEGLC Negative   < > Negative   URINEBILI Negative   < > Negative   URINEKETONE Negative   < > Trace*   SG 1.026   < > 1.020   UBLD 0.2 mg/dL*   < > Negative   URINEPH 6.0   < > 5.5   PROTEIN Negative   < > Negative   UROBILINOGEN  --   --  0.2 E.U./dL   NITRITE Negative   < > Negative   LEUKEST Negative   < > Negative   RBCU 48*   < >  --    WBCU 1   < >  --     < > = values in this interval not displayed.     Acute Labs   Urine Culture    Culture   Date Value Ref Range Status   01/09/2019 No Growth  Final   10/23/2018 No Growth  Final

## 2021-12-02 NOTE — PROGRESS NOTES
Patient is roomed via telephone for a virtual visit.  Patient confirmed he is in the [facility] at the time of this appointment.  Patient understands that this virtual visit is billable and agree to proceed with appointment.

## 2021-12-02 NOTE — PATIENT INSTRUCTIONS
Patient Stated Goal: Pass my stone  Symptom Control While Passing A Stone    The goal of [facility] is to let a smaller kidney stone (less than 4 to 5 mm) pass without intervention if possible. Giving your body a chance to clear the stone may take a few hours up to a few weeks.  Keeping you well-informed, safe and fairly comfortable is important.    Drink to thirst  Do not attempt to  flush out  your stone by drinking too much fluid. This does not work and may increase nausea. Drink enough to satisfy your body s thirst. Eating your normal diet is fine.   Medications (that may be suggested or prescribed)    Ibuprofen (Advil or Motrin) Available over the counter  o Take two (200mg) tablets every six hours until the stone passes.  o Prevents spasm of the ureter.    o Decreases pain.      Dramamine* (drowsy version, non-generic formulation) Available over the counter  o Take 50mg at bedtime  o Decreases spasms of the ureter  o Decreases nausea  o Decreases acute pain  o Decreases recurrence of pain for 24 hours  o Will help you sleep  *This medication will cause increase drowsiness, do not drive or operate machinery for 6 hours.      Narcotics (Percocet, Vicodin, Dilaudid) Take as prescribed for severe pain unrelieved by ibuprofen and Dramamine  o Narcotics have significant side effects and only  cover-up  pain. They have no effect on the cause of pain.  o Common side effects  - Confusion, disorientation and sedation - DO NOT DRIVE OR OPERATE MACHINERY WITHIN 24 HOURS  - Nausea - take Dramamine or Zofran or Haldol to help control  - Constipation  - Sleep disturbances      Ondansetron (Zofran) Take as prescribed  o Reserve for severe nausea  o May cause constipation, start over the counter Miralax if needed      Second Line Anti-Nausea Medication: Adding a different anti-nausea medication maybe helpful for persistent nausea.  The combined effect of different types of anti-nausea medications maybe more  effective than either medication by itself, even in higher doses.  o Compazine: Take as prescribed      Information about kidney stones    Crystals can form if chemicals are too concentrated in your urine. If the crystal grows over time, a stone may form. A stone usually isn t painful while it is still in the kidney.    As the stone begins to leave the kidney, you may experience episodes of flank pain as the kidney stone approaches the entrance to the ureter. Some people feel a vague ache in the side.    Kidney stones may fall into the ureter. Some stones are tiny and pass through without causing symptoms. The ureter is a small tube (approximately 1/8 of an inch wide). A kidney stone can get stuck and block the ureter. If this happens, urine backs up and flows back to the kidney. Back pressure on the kidney can cause:  o Severe flank pain radiating to the groin.  o Severe nausea and vomiting.  o The pain can occur in the lower back, side, groin or all three.      When the stone reaches the lower ureter, this can irritate the bladder and sensations of feeling the urge to urinate frequently and urgently may occur.      Once the stone passes out of your ureter and into your bladder, the symptoms of urgency and frequency will often disappear. Sometimes pain will come back for a short period and will not be as severe as before. The passage of the stone from your bladder and out of your body is usually not a problem. The urethra is at least twice as wide as the normal ureter, so the stone doesn t usually block it.    Strain all urine  If you pass the stone, save it. Place it in the container we have provided and bring it to the [facility] within a week of passing it. Your stone will then be sent for analysis which takes about a month.     Signs and symptoms you might experience    Nausea    Decreased appetite    Urinary frequency    Bloody urine     Chills    Fatigue    When to call [facility] or  go to the Emergency Room    Fever with a temperature greater than 100.1    Severe pain    Persistent nausea/vomiting    If the pain worsens or nausea/vomiting is uncontrolled with medications, STOP eating & drinking. You need to have an empty stomach for 8 hours prior to surgery. Call the [facility] [place] immediately at [phone].           Follow-up    Low dose CT scan with doctor visit 1-2 weeks after initial clinic visit per doctor s instructions    Please cancel the CT scan visit if you pass a stone. Reschedule for a one month follow-up with doctor to discuss stone composition and future prevention.    Preventing future stones    Approximately a month after your stone is sent out for analysis, a prevention visit will occur with your provider, to discuss an individualized plan for prevention of new stones and to discuss managing stones that you may still have. Along with the analysis of the kidney stone, blood and urine tests may be indicated to develop this plan. Knowing the type of kidney stones you make, and why, allows the providers at the [facility] to recommend specific ways to prevent them.    Follow-up visits are an important part of monitoring and preventing future re-occurrences.    The [facility] is available for questions or concerns 24 hours a day at [phone]

## 2021-12-12 ENCOUNTER — HEALTH MAINTENANCE LETTER (OUTPATIENT)
Age: 71
End: 2021-12-12

## 2021-12-22 ENCOUNTER — HOSPITAL ENCOUNTER (OUTPATIENT)
Dept: CT IMAGING | Facility: CLINIC | Age: 71
Discharge: HOME | End: 2021-12-22
Attending: UROLOGY | Admitting: UROLOGY
Payer: COMMERCIAL

## 2021-12-22 DIAGNOSIS — N20.1 CALCULUS OF URETER: ICD-10-CM

## 2021-12-22 PROCEDURE — 74176 CT ABD & PELVIS W/O CONTRAST: CPT

## 2021-12-23 ENCOUNTER — VIRTUAL VISIT (OUTPATIENT)
Dept: UROLOGY | Facility: CLINIC | Age: 71
End: 2021-12-23
Payer: COMMERCIAL

## 2021-12-23 DIAGNOSIS — N20.1 CALCULUS OF URETER: Primary | ICD-10-CM

## 2021-12-23 PROCEDURE — 99213 OFFICE O/P EST LOW 20 MIN: CPT | Mod: TEL | Performed by: UROLOGY

## 2021-12-23 ASSESSMENT — PAIN SCALES - GENERAL: PAINLEVEL: MILD PAIN (2)

## 2021-12-23 NOTE — PATIENT INSTRUCTIONS
Patient Stated Goal: Pass my stone  Symptom Control While Passing A Stone    The goal of [facility] [place] is to let a smaller kidney stone (less than 4 to 5 mm) pass without intervention if possible. Giving your body a chance to clear the stone may take a few hours up to a few weeks.  Keeping you well-informed, safe and fairly comfortable is important.    Drink to thirst  Do not attempt to  flush out  your stone by drinking too much fluid. This does not work and may increase nausea. Drink enough to satisfy your body s thirst. Eating your normal diet is fine.   Medications (that may be suggested or prescribed)    Ibuprofen (Advil or Motrin) Available over the counter  o Take two (200mg) tablets every six hours until the stone passes.  o Prevents spasm of the ureter.    o Decreases pain.      Dramamine* (drowsy version, non-generic formulation) Available over the counter  o Take 50mg at bedtime  o Decreases spasms of the ureter  o Decreases nausea  o Decreases acute pain  o Decreases recurrence of pain for 24 hours  o Will help you sleep  *This medication will cause increase drowsiness, do not drive or operate machinery for 6 hours.      Narcotics (Percocet, Vicodin, Dilaudid) Take as prescribed for severe pain unrelieved by ibuprofen and Dramamine  o Narcotics have significant side effects and only  cover-up  pain. They have no effect on the cause of pain.  o Common side effects  - Confusion, disorientation and sedation - DO NOT DRIVE OR OPERATE MACHINERY WITHIN 24 HOURS  - Nausea - take Dramamine or Zofran or Haldol to help control  - Constipation  - Sleep disturbances      Ondansetron (Zofran) Take as prescribed  o Reserve for severe nausea  o May cause constipation, start over the counter Miralax if needed      Second Line Anti-Nausea Medication: Adding a different anti-nausea medication maybe helpful for persistent nausea.  The combined effect of different types of anti-nausea medications maybe more  effective than either medication by itself, even in higher doses.  o Compazine: Take as prescribed      Information about kidney stones    Crystals can form if chemicals are too concentrated in your urine. If the crystal grows over time, a stone may form. A stone usually isn t painful while it is still in the kidney.    As the stone begins to leave the kidney, you may experience episodes of flank pain as the kidney stone approaches the entrance to the ureter. Some people feel a vague ache in the side.    Kidney stones may fall into the ureter. Some stones are tiny and pass through without causing symptoms. The ureter is a small tube (approximately 1/8 of an inch wide). A kidney stone can get stuck and block the ureter. If this happens, urine backs up and flows back to the kidney. Back pressure on the kidney can cause:  o Severe flank pain radiating to the groin.  o Severe nausea and vomiting.  o The pain can occur in the lower back, side, groin or all three.      When the stone reaches the lower ureter, this can irritate the bladder and sensations of feeling the urge to urinate frequently and urgently may occur.      Once the stone passes out of your ureter and into your bladder, the symptoms of urgency and frequency will often disappear. Sometimes pain will come back for a short period and will not be as severe as before. The passage of the stone from your bladder and out of your body is usually not a problem. The urethra is at least twice as wide as the normal ureter, so the stone doesn t usually block it.    Strain all urine  If you pass the stone, save it. Place it in the container we have provided and bring it to the [facility] within a week of passing it. Your stone will then be sent for analysis which takes about a month.     Signs and symptoms you might experience    Nausea    Decreased appetite    Urinary frequency    Bloody urine     Chills    Fatigue    When to call [facility] or  go to the Emergency Room    Fever with a temperature greater than 100.1    Severe pain    Persistent nausea/vomiting    If the pain worsens or nausea/vomiting is uncontrolled with medications, STOP eating & drinking. You need to have an empty stomach for 8 hours prior to surgery. Call the [facility] immediately at [phone].           Follow-up    Low dose CT scan with doctor visit 1-2 weeks after initial clinic visit per doctor s instructions    Please cancel the CT scan visit if you pass a stone. Reschedule for a one month follow-up with doctor to discuss stone composition and future prevention.    Preventing future stones    Approximately a month after your stone is sent out for analysis, a prevention visit will occur with your provider, to discuss an individualized plan for prevention of new stones and to discuss managing stones that you may still have. Along with the analysis of the kidney stone, blood and urine tests may be indicated to develop this plan. Knowing the type of kidney stones you make, and why, allows the providers at the [facility] to recommend specific ways to prevent them.    Follow-up visits are an important part of monitoring and preventing future re-occurrences.    The [facility] is available for questions or concerns 24 hours a day at [phone]

## 2021-12-23 NOTE — PROGRESS NOTES
Assessment/Plan:    Assessment & Plan   Maxi was seen today for follow up.    Diagnoses and all orders for this visit:    Calculus of ureter  -     Patient Stated Goal: Pass my stone  -     CT Abdomen Pelvis w/o Contrast; Future        Stone Management Plan  Stone Management 11/17/2021 12/2/2021 12/23/2021   Urinary Tract Infection No suspicion of infection No suspicion of infection No suspicion of infection   Renal Colic Well controlled symptoms Asymptomatic at this time Well controlled symptoms   Renal Failure No suspicion of renal failure No suspicion of renal failure No suspicion of renal failure   Current CT date 11/17/2021 12/1/2021 12/22/2021   Right sided stones? No No No   R Stone Event No current event No current event No current event   Left sided stones? Yes Yes Yes   L Number of ureteral stones 2 1 1   L GSD of ureteral stones 5 5 5   L Location of ureteral stone Distal Mid Distal   L Number of kidney stones  No renal stones No renal stones 1   L GSD of kidney stones - - 2 - 4   L Hydronephrosis Mild None None   L Stone Event New event Established event Established event   Diagnosis date 11/17/2021 - -   Initial location of primary symptomatic stone Distal - -   Initial GSD of primary symptomatic stone 5 - -   L MET Status Initiation Progression Progression   L Current Plan MET MET MET   MET 2 week F/U 2 week F/U -           PLAN      Phone call duration: 8 minutes  13 minutes spent on the date of the encounter doing chart review, history and exam, documentation and further activities per the note    DANTE KAHN MD  [facility]    Subjective:     HPI  Mr. Maxi Hoffman is a 71 year old  male who is being evaluated via a billable telephone visit by [facility] [place] for medical expulsive therapy follow up.     On last encounter, his 5 mm stone was in left distal ureter with no hydronephrosis. He has had no unanticipated  events.    Symptoms have been minimal . He is asymptomatic at present. He denies symptoms of fever, chills, flank pain, nausea, vomiting, urinary frequency and dysuria.     New CT scan was personally reviewed and demonstrates progression of stone to left UVJ with no hydronephrosis.     He will continue to attempt to pass stone and will return in 1 month with further imaging. .         ROS   Review of systems is negative except for HPI.    No past medical history on file.    Past Surgical History:   Procedure Laterality Date     COMBINED CYSTOSCOPY, INSERT STENT URETER(S) Left 1/4/2019    Procedure: CYSTOSCOPY, LEFT URETEROSCOPY LASER LITHOTRIPSY STENT INSERTION;  Surgeon: Daniel Dean MD;  Location: [facility];  Service: Urology     OTHER SURGICAL HISTORY      never had surgery       Current Outpatient Medications   Medication Sig Dispense Refill     atorvastatin (LIPITOR) 20 MG tablet [ATORVASTATIN (LIPITOR) 20 MG TABLET] Take 20 mg by mouth.       celecoxib (CELEBREX) 200 MG capsule [CELECOXIB (CELEBREX) 200 MG CAPSULE] Take 200 mg by mouth.       IBUPROFEN ORAL [IBUPROFEN ORAL] Take 200 mg by mouth as needed.              tamsulosin (FLOMAX) 0.4 mg cap [TAMSULOSIN (FLOMAX) 0.4 MG CAP] Take 1 capsule (0.4 mg total) by mouth daily. 90 capsule 3       Allergies   Allergen Reactions     Levaquin [Levofloxacin] Itching       Social History     Socioeconomic History     Marital status:      Spouse name: Not on file     Number of children: Not on file     Years of education: Not on file     Highest education level: Not on file   Occupational History     Not on file   Tobacco Use     Smoking status: Never Smoker     Smokeless tobacco: Never Used   Substance and Sexual Activity     Alcohol use: No     Drug use: No     Sexual activity: Not on file   Other Topics Concern     Not on file   Social History Narrative     Not on file     Social Determinants of Health     Financial Resource Strain: Not  on file   Food Insecurity: Not on file   Transportation Needs: Not on file   Physical Activity: Not on file   Stress: Not on file   Social Connections: Not on file   Intimate Partner Violence: Not on file   Housing Stability: Not on file       Family History   Problem Relation Age of Onset     Cancer Mother         breast     Diabetes Father      Arthritis Father      Urolithiasis Brother        Objective:     No vitals or physical exam obtained due to virtual visit  Labs     Most Recent 3 CBC's:Recent Labs   Lab Test 11/25/21 1949 11/23/21  0655 11/17/21  0123   WBC 6.1 9.0 9.4   HGB 14.1 15.3 15.5   MCV 93 90 91    243 217     Most Recent 3 BMP's:Recent Labs   Lab Test 11/25/21 1949 11/23/21  0655 11/17/21  0123    142 137   POTASSIUM 4.3 4.0 3.8   CHLORIDE 103 108* 103   CO2 26 25 26   BUN 25 19 22   CR 1.86* 1.24 1.23   ANIONGAP 12 9 8   SPARKLE 9.7 10.5 10.8*    123 161*     Most Recent Urinalysis:Recent Labs   Lab Test 11/25/21 2043 07/19/21  0953 02/13/19  1048   COLOR Light Yellow   < > Yellow   APPEARANCE Clear   < > Clear   URINEGLC Negative   < > Negative   URINEBILI Negative   < > Negative   URINEKETONE Negative   < > Trace*   SG 1.026   < > 1.020   UBLD 0.2 mg/dL*   < > Negative   URINEPH 6.0   < > 5.5   PROTEIN Negative   < > Negative   UROBILINOGEN  --   --  0.2 E.U./dL   NITRITE Negative   < > Negative   LEUKEST Negative   < > Negative   RBCU 48*   < >  --    WBCU 1   < >  --     < > = values in this interval not displayed.     Acute Labs   Urine Culture    Culture   Date Value Ref Range Status   01/09/2019 No Growth  Final   10/23/2018 No Growth  Final

## 2021-12-23 NOTE — PROGRESS NOTES
Patient is roomed via telephone for a virtual visit.  Patient confirmed he is in the [facility] at the time of this appointment.  Patient understands that this virtual visit is billable and agree to proceed with appointment.

## 2022-01-31 ENCOUNTER — HOSPITAL ENCOUNTER (OUTPATIENT)
Dept: CT IMAGING | Facility: CLINIC | Age: 72
Discharge: HOME | End: 2022-01-31
Attending: UROLOGY | Admitting: UROLOGY
Payer: COMMERCIAL

## 2022-01-31 ENCOUNTER — VIRTUAL VISIT (OUTPATIENT)
Dept: UROLOGY | Facility: CLINIC | Age: 72
End: 2022-01-31
Payer: COMMERCIAL

## 2022-01-31 DIAGNOSIS — N20.1 CALCULUS OF URETER: ICD-10-CM

## 2022-01-31 DIAGNOSIS — N20.0 CALCULUS OF KIDNEY: ICD-10-CM

## 2022-01-31 DIAGNOSIS — N20.1 CALCULUS OF URETER: Primary | ICD-10-CM

## 2022-01-31 PROCEDURE — 99213 OFFICE O/P EST LOW 20 MIN: CPT | Mod: TEL | Performed by: UROLOGY

## 2022-01-31 PROCEDURE — 74176 CT ABD & PELVIS W/O CONTRAST: CPT

## 2022-01-31 ASSESSMENT — PAIN SCALES - GENERAL: PAINLEVEL: NO PAIN (0)

## 2022-01-31 NOTE — PATIENT INSTRUCTIONS
Patient Stated Goal: Pass my stone  Symptom Control While Passing A Stone    The goal of [facility] [place] is to let a smaller kidney stone (less than 4 to 5 mm) pass without intervention if possible. Giving your body a chance to clear the stone may take a few hours up to a few weeks.  Keeping you well-informed, safe and fairly comfortable is important.    Drink to thirst  Do not attempt to  flush out  your stone by drinking too much fluid. This does not work and may increase nausea. Drink enough to satisfy your body s thirst. Eating your normal diet is fine.   Medications (that may be suggested or prescribed)    Ibuprofen (Advil or Motrin) Available over the counter  o Take two (200mg) tablets every six hours until the stone passes.  o Prevents spasm of the ureter.    o Decreases pain.      Dramamine* (drowsy version, non-generic formulation) Available over the counter  o Take 50mg at bedtime  o Decreases spasms of the ureter  o Decreases nausea  o Decreases acute pain  o Decreases recurrence of pain for 24 hours  o Will help you sleep  *This medication will cause increase drowsiness, do not drive or operate machinery for 6 hours.      Narcotics (Percocet, Vicodin, Dilaudid) Take as prescribed for severe pain unrelieved by ibuprofen and Dramamine  o Narcotics have significant side effects and only  cover-up  pain. They have no effect on the cause of pain.  o Common side effects  - Confusion, disorientation and sedation - DO NOT DRIVE OR OPERATE MACHINERY WITHIN 24 HOURS  - Nausea - take Dramamine or Zofran or Haldol to help control  - Constipation  - Sleep disturbances      Ondansetron (Zofran) Take as prescribed  o Reserve for severe nausea  o May cause constipation, start over the counter Miralax if needed      Second Line Anti-Nausea Medication: Adding a different anti-nausea medication maybe helpful for persistent nausea.  The combined effect of different types of anti-nausea medications maybe more  effective than either medication by itself, even in higher doses.  o Compazine: Take as prescribed      Information about kidney stones    Crystals can form if chemicals are too concentrated in your urine. If the crystal grows over time, a stone may form. A stone usually isn t painful while it is still in the kidney.    As the stone begins to leave the kidney, you may experience episodes of flank pain as the kidney stone approaches the entrance to the ureter. Some people feel a vague ache in the side.    Kidney stones may fall into the ureter. Some stones are tiny and pass through without causing symptoms. The ureter is a small tube (approximately 1/8 of an inch wide). A kidney stone can get stuck and block the ureter. If this happens, urine backs up and flows back to the kidney. Back pressure on the kidney can cause:  o Severe flank pain radiating to the groin.  o Severe nausea and vomiting.  o The pain can occur in the lower back, side, groin or all three.      When the stone reaches the lower ureter, this can irritate the bladder and sensations of feeling the urge to urinate frequently and urgently may occur.      Once the stone passes out of your ureter and into your bladder, the symptoms of urgency and frequency will often disappear. Sometimes pain will come back for a short period and will not be as severe as before. The passage of the stone from your bladder and out of your body is usually not a problem. The urethra is at least twice as wide as the normal ureter, so the stone doesn t usually block it.    Strain all urine  If you pass the stone, save it. Place it in the container we have provided and bring it to the [facility] [place] within a week of passing it. Your stone will then be sent for analysis which takes about a month.     Signs and symptoms you might experience    Nausea    Decreased appetite    Urinary frequency    Bloody urine     Chills    Fatigue    When to call [facility] [place] or  go to the Emergency Room    Fever with a temperature greater than 100.1    Severe pain    Persistent nausea/vomiting    If the pain worsens or nausea/vomiting is uncontrolled with medications, STOP eating & drinking. You need to have an empty stomach for 8 hours prior to surgery. Call the [facility] [place] immediately at [phone].           Follow-up    Low dose CT scan with doctor visit 1-2 weeks after initial clinic visit per doctor s instructions    Please cancel the CT scan visit if you pass a stone. Reschedule for a one month follow-up with doctor to discuss stone composition and future prevention.    Preventing future stones    Approximately a month after your stone is sent out for analysis, a prevention visit will occur with your provider, to discuss an individualized plan for prevention of new stones and to discuss managing stones that you may still have. Along with the analysis of the kidney stone, blood and urine tests may be indicated to develop this plan. Knowing the type of kidney stones you make, and why, allows the providers at the [facility] [place] to recommend specific ways to prevent them.    Follow-up visits are an important part of monitoring and preventing future re-occurrences.    The [facility] [place] is available for questions or concerns 24 hours a day at [phone]

## 2022-01-31 NOTE — PROGRESS NOTES
Assessment/Plan:    Assessment & Plan   Maxi was seen today for follow up.    Diagnoses and all orders for this visit:    Calculus of ureter  -     Patient Stated Goal: Pass my stone  -     CT Abdomen Pelvis w/o Contrast; Future    Calculus of kidney        Stone Management Plan  Stone Management 12/2/2021 12/23/2021 1/31/2022   Urinary Tract Infection No suspicion of infection No suspicion of infection No suspicion of infection   Renal Colic Asymptomatic at this time Well controlled symptoms Well controlled symptoms   Renal Failure No suspicion of renal failure No suspicion of renal failure No suspicion of renal failure   Current CT date 12/1/2021 12/22/2021 1/31/2022   Right sided stones? No No No   R Stone Event No current event No current event No current event   Left sided stones? Yes Yes Yes   L Number of ureteral stones 1 1 1   L GSD of ureteral stones 5 5 5   L Location of ureteral stone Mid Distal Distal   L Number of kidney stones  No renal stones 1 1   L GSD of kidney stones - 2 - 4 < 2   L Hydronephrosis None None None   L Stone Event Established event Established event Established event   Diagnosis date - - -   Initial location of primary symptomatic stone - - -   Initial GSD of primary symptomatic stone - - -   L MET Status Progression Progression Progression   L Current Plan MET MET MET   MET 2 week F/U - -           PLAN      Phone call duration: 8 minutes  13 minutes spent on the date of the encounter doing chart review, history and exam, documentation and further activities per the note    DANTE KAHN MD  [facility]    Subjective:     HPI  Mr. Maxi Hoffman is a 71 year old  male who is being evaluated via a billable telephone visit by [facility] [place] for medical expulsive therapy follow up.     On last encounter, his 5 mm stone was in left distal ureter with no hydronephrosis. He has had no unanticipated  events.    Symptoms have been minimal . He is asymptomatic at present. He denies symptoms of fever, chills, flank pain, nausea, vomiting, urinary frequency and dysuria. One brief blip of discomfort a couple of weeks ago.    New CT scan was personally reviewed and demonstrates that urteral stone is now bulging into the bladder with no hydronephrosis.     He will continue to attempt to pass stone and will return in 1 month with further imaging. .         ROS   Review of systems is negative except for HPI.    No past medical history on file.    Past Surgical History:   Procedure Laterality Date     COMBINED CYSTOSCOPY, INSERT STENT URETER(S) Left 1/4/2019    Procedure: CYSTOSCOPY, LEFT URETEROSCOPY LASER LITHOTRIPSY STENT INSERTION;  Surgeon: Daniel Dean MD;  Location: [facility];  Service: Urology     OTHER SURGICAL HISTORY      never had surgery       Current Outpatient Medications   Medication Sig Dispense Refill     celecoxib (CELEBREX) 200 MG capsule [CELECOXIB (CELEBREX) 200 MG CAPSULE] Take 200 mg by mouth.       IBUPROFEN ORAL [IBUPROFEN ORAL] Take 200 mg by mouth as needed.              tamsulosin (FLOMAX) 0.4 mg cap [TAMSULOSIN (FLOMAX) 0.4 MG CAP] Take 1 capsule (0.4 mg total) by mouth daily. 90 capsule 3     atorvastatin (LIPITOR) 20 MG tablet [ATORVASTATIN (LIPITOR) 20 MG TABLET] Take 20 mg by mouth.         Allergies   Allergen Reactions     Levaquin [Levofloxacin] Itching       Social History     Socioeconomic History     Marital status:      Spouse name: Not on file     Number of children: Not on file     Years of education: Not on file     Highest education level: Not on file   Occupational History     Not on file   Tobacco Use     Smoking status: Never Smoker     Smokeless tobacco: Never Used   Substance and Sexual Activity     Alcohol use: No     Drug use: No     Sexual activity: Not on file   Other Topics Concern     Not on file   Social History Narrative     Not on file      Social Determinants of Health     Financial Resource Strain: Not on file   Food Insecurity: Not on file   Transportation Needs: Not on file   Physical Activity: Not on file   Stress: Not on file   Social Connections: Not on file   Intimate Partner Violence: Not on file   Housing Stability: Not on file       Family History   Problem Relation Age of Onset     Cancer Mother         breast     Diabetes Father      Arthritis Father      Urolithiasis Brother        Objective:     No vitals or physical exam obtained due to virtual visit  Labs     Most Recent 3 CBC's:Recent Labs   Lab Test 11/25/21 1949 11/23/21  0655 11/17/21  0123   WBC 6.1 9.0 9.4   HGB 14.1 15.3 15.5   MCV 93 90 91    243 217     Most Recent 3 BMP's:Recent Labs   Lab Test 11/25/21 1949 11/23/21  0655 11/17/21  0123    142 137   POTASSIUM 4.3 4.0 3.8   CHLORIDE 103 108* 103   CO2 26 25 26   BUN 25 19 22   CR 1.86* 1.24 1.23   ANIONGAP 12 9 8   SPARKLE 9.7 10.5 10.8*    123 161*     Most Recent Urinalysis:Recent Labs   Lab Test 11/25/21 2043 07/19/21  0953 02/13/19  1048   COLOR Light Yellow   < > Yellow   APPEARANCE Clear   < > Clear   URINEGLC Negative   < > Negative   URINEBILI Negative   < > Negative   URINEKETONE Negative   < > Trace*   SG 1.026   < > 1.020   UBLD 0.2 mg/dL*   < > Negative   URINEPH 6.0   < > 5.5   PROTEIN Negative   < > Negative   UROBILINOGEN  --   --  0.2 E.U./dL   NITRITE Negative   < > Negative   LEUKEST Negative   < > Negative   RBCU 48*   < >  --    WBCU 1   < >  --     < > = values in this interval not displayed.     Acute Labs   Urine Culture    Culture   Date Value Ref Range Status   01/09/2019 No Growth  Final   10/23/2018 No Growth  Final

## 2022-02-02 ENCOUNTER — TELEPHONE (OUTPATIENT)
Dept: UROLOGY | Facility: CLINIC | Age: 72
End: 2022-02-02
Payer: COMMERCIAL

## 2022-02-24 ENCOUNTER — TELEPHONE (OUTPATIENT)
Dept: UROLOGY | Facility: CLINIC | Age: 72
End: 2022-02-24
Payer: COMMERCIAL

## 2022-02-25 ENCOUNTER — TELEPHONE (OUTPATIENT)
Dept: UROLOGY | Facility: CLINIC | Age: 72
End: 2022-02-25
Payer: COMMERCIAL

## 2022-10-02 ENCOUNTER — HEALTH MAINTENANCE LETTER (OUTPATIENT)
Age: 72
End: 2022-10-02

## 2023-02-11 ENCOUNTER — HEALTH MAINTENANCE LETTER (OUTPATIENT)
Age: 73
End: 2023-02-11

## 2024-03-09 ENCOUNTER — HEALTH MAINTENANCE LETTER (OUTPATIENT)
Age: 74
End: 2024-03-09

## 2025-01-29 ENCOUNTER — HOSPITAL ENCOUNTER (INPATIENT)
Facility: CLINIC | Age: 75
LOS: 1 days | Discharge: HOME | End: 2025-01-30
Attending: EMERGENCY MEDICINE
Payer: COMMERCIAL

## 2025-01-29 ENCOUNTER — APPOINTMENT (OUTPATIENT)
Dept: ULTRASOUND IMAGING | Facility: CLINIC | Age: 75
DRG: 641 | End: 2025-01-29
Attending: EMERGENCY MEDICINE
Payer: COMMERCIAL

## 2025-01-29 DIAGNOSIS — I10 ESSENTIAL HYPERTENSION: Primary | ICD-10-CM

## 2025-01-29 DIAGNOSIS — E87.5 HYPERKALEMIA: ICD-10-CM

## 2025-01-29 DIAGNOSIS — M19.90 INFLAMMATORY ARTHRITIS: ICD-10-CM

## 2025-01-29 LAB
ALBUMIN MFR UR ELPH: 8.7 MG/DL
ALBUMIN SERPL BCG-MCNC: 3.8 G/DL (ref 3.5–5.2)
ALBUMIN SERPL BCG-MCNC: 4.2 G/DL (ref 3.5–5.2)
ALBUMIN UR-MCNC: 10 MG/DL
ANION GAP SERPL CALCULATED.3IONS-SCNC: 10 MMOL/L (ref 7–15)
ANION GAP SERPL CALCULATED.3IONS-SCNC: 12 MMOL/L (ref 7–15)
ANION GAP SERPL CALCULATED.3IONS-SCNC: 7 MMOL/L (ref 7–15)
APPEARANCE UR: CLEAR
ATRIAL RATE - MUSE: 50 BPM
BASE EXCESS BLDV CALC-SCNC: -2.1 MMOL/L (ref -3–3)
BASOPHILS # BLD AUTO: 0 10E3/UL (ref 0–0.2)
BASOPHILS NFR BLD AUTO: 0 %
BILIRUB UR QL STRIP: NEGATIVE
BUN SERPL-MCNC: 42.2 MG/DL (ref 8–23)
BUN SERPL-MCNC: 42.6 MG/DL (ref 8–23)
BUN SERPL-MCNC: 49.2 MG/DL (ref 8–23)
CALCIUM SERPL-MCNC: 10 MG/DL (ref 8.8–10.4)
CALCIUM SERPL-MCNC: 9.4 MG/DL (ref 8.8–10.4)
CALCIUM SERPL-MCNC: 9.5 MG/DL (ref 8.8–10.4)
CHLORIDE SERPL-SCNC: 107 MMOL/L (ref 98–107)
CHLORIDE SERPL-SCNC: 109 MMOL/L (ref 98–107)
CHLORIDE SERPL-SCNC: 110 MMOL/L (ref 98–107)
COLOR UR AUTO: ABNORMAL
CREAT SERPL-MCNC: 1.83 MG/DL (ref 0.67–1.17)
CREAT SERPL-MCNC: 1.93 MG/DL (ref 0.67–1.17)
CREAT SERPL-MCNC: 2.12 MG/DL (ref 0.67–1.17)
CREAT UR-MCNC: 108 MG/DL
DIASTOLIC BLOOD PRESSURE - MUSE: NORMAL MMHG
EGFRCR SERPLBLD CKD-EPI 2021: 32 ML/MIN/1.73M2
EGFRCR SERPLBLD CKD-EPI 2021: 36 ML/MIN/1.73M2
EGFRCR SERPLBLD CKD-EPI 2021: 38 ML/MIN/1.73M2
EOSINOPHIL # BLD AUTO: 0.1 10E3/UL (ref 0–0.7)
EOSINOPHIL NFR BLD AUTO: 2 %
ERYTHROCYTE [DISTWIDTH] IN BLOOD BY AUTOMATED COUNT: 13.6 % (ref 10–15)
ERYTHROCYTE [DISTWIDTH] IN BLOOD BY AUTOMATED COUNT: 13.8 % (ref 10–15)
GLUCOSE BLDC GLUCOMTR-MCNC: 111 MG/DL (ref 70–99)
GLUCOSE BLDC GLUCOMTR-MCNC: 116 MG/DL (ref 70–99)
GLUCOSE BLDC GLUCOMTR-MCNC: 116 MG/DL (ref 70–99)
GLUCOSE BLDC GLUCOMTR-MCNC: 127 MG/DL (ref 70–99)
GLUCOSE BLDC GLUCOMTR-MCNC: 128 MG/DL (ref 70–99)
GLUCOSE BLDC GLUCOMTR-MCNC: 143 MG/DL (ref 70–99)
GLUCOSE BLDC GLUCOMTR-MCNC: 157 MG/DL (ref 70–99)
GLUCOSE BLDC GLUCOMTR-MCNC: 159 MG/DL (ref 70–99)
GLUCOSE BLDC GLUCOMTR-MCNC: 168 MG/DL (ref 70–99)
GLUCOSE BLDC GLUCOMTR-MCNC: 178 MG/DL (ref 70–99)
GLUCOSE BLDC GLUCOMTR-MCNC: 187 MG/DL (ref 70–99)
GLUCOSE BLDC GLUCOMTR-MCNC: 204 MG/DL (ref 70–99)
GLUCOSE BLDC GLUCOMTR-MCNC: 205 MG/DL (ref 70–99)
GLUCOSE BLDC GLUCOMTR-MCNC: 230 MG/DL (ref 70–99)
GLUCOSE BLDC GLUCOMTR-MCNC: 60 MG/DL (ref 70–99)
GLUCOSE BLDC GLUCOMTR-MCNC: 97 MG/DL (ref 70–99)
GLUCOSE SERPL-MCNC: 116 MG/DL (ref 70–99)
GLUCOSE SERPL-MCNC: 167 MG/DL (ref 70–99)
GLUCOSE SERPL-MCNC: 209 MG/DL (ref 70–99)
GLUCOSE UR STRIP-MCNC: NEGATIVE MG/DL
HCO3 BLDV-SCNC: 23 MMOL/L (ref 21–28)
HCO3 SERPL-SCNC: 18 MMOL/L (ref 22–29)
HCO3 SERPL-SCNC: 20 MMOL/L (ref 22–29)
HCO3 SERPL-SCNC: 22 MMOL/L (ref 22–29)
HCT VFR BLD AUTO: 35.4 % (ref 40–53)
HCT VFR BLD AUTO: 40.5 % (ref 40–53)
HGB BLD-MCNC: 11.7 G/DL (ref 13.3–17.7)
HGB BLD-MCNC: 13.3 G/DL (ref 13.3–17.7)
HGB UR QL STRIP: NEGATIVE
HYALINE CASTS: 16 /LPF
IMM GRANULOCYTES # BLD: 0 10E3/UL
IMM GRANULOCYTES NFR BLD: 0 %
INTERPRETATION ECG - MUSE: NORMAL
KETONES UR STRIP-MCNC: NEGATIVE MG/DL
LEUKOCYTE ESTERASE UR QL STRIP: NEGATIVE
LYMPHOCYTES # BLD AUTO: 1.4 10E3/UL (ref 0.8–5.3)
LYMPHOCYTES NFR BLD AUTO: 20 %
MAGNESIUM SERPL-MCNC: 2.3 MG/DL (ref 1.7–2.3)
MCH RBC QN AUTO: 31 PG (ref 26.5–33)
MCH RBC QN AUTO: 31.4 PG (ref 26.5–33)
MCHC RBC AUTO-ENTMCNC: 32.8 G/DL (ref 31.5–36.5)
MCHC RBC AUTO-ENTMCNC: 33.1 G/DL (ref 31.5–36.5)
MCV RBC AUTO: 94 FL (ref 78–100)
MCV RBC AUTO: 96 FL (ref 78–100)
MONOCYTES # BLD AUTO: 0.6 10E3/UL (ref 0–1.3)
MONOCYTES NFR BLD AUTO: 9 %
MUCOUS THREADS #/AREA URNS LPF: PRESENT /LPF
NEUTROPHILS # BLD AUTO: 4.9 10E3/UL (ref 1.6–8.3)
NEUTROPHILS NFR BLD AUTO: 69 %
NITRATE UR QL: NEGATIVE
NRBC # BLD AUTO: 0 10E3/UL
NRBC BLD AUTO-RTO: 0 /100
O2/TOTAL GAS SETTING VFR VENT: 21 %
OXYHGB MFR BLDV: 87 % (ref 70–75)
P AXIS - MUSE: 56 DEGREES
PCO2 BLDV: 41 MM HG (ref 40–50)
PH BLDV: 7.36 [PH] (ref 7.32–7.43)
PH UR STRIP: 6 [PH] (ref 5–7)
PHOSPHATE SERPL-MCNC: 3.1 MG/DL (ref 2.5–4.5)
PHOSPHATE SERPL-MCNC: 3.9 MG/DL (ref 2.5–4.5)
PLATELET # BLD AUTO: 214 10E3/UL (ref 150–450)
PLATELET # BLD AUTO: 225 10E3/UL (ref 150–450)
PO2 BLDV: 52 MM HG (ref 25–47)
POTASSIUM SERPL-SCNC: 4.6 MMOL/L (ref 3.4–5.3)
POTASSIUM SERPL-SCNC: 5.7 MMOL/L (ref 3.4–5.3)
POTASSIUM SERPL-SCNC: 5.7 MMOL/L (ref 3.4–5.3)
POTASSIUM SERPL-SCNC: 6.5 MMOL/L (ref 3.4–5.3)
POTASSIUM SERPL-SCNC: 6.5 MMOL/L (ref 3.4–5.3)
POTASSIUM SERPL-SCNC: 6.6 MMOL/L (ref 3.4–5.3)
PR INTERVAL - MUSE: 174 MS
PROT/CREAT 24H UR: 0.08 MG/MG CR (ref 0–0.2)
QRS DURATION - MUSE: 90 MS
QT - MUSE: 388 MS
QTC - MUSE: 353 MS
R AXIS - MUSE: 34 DEGREES
RBC # BLD AUTO: 3.77 10E6/UL (ref 4.4–5.9)
RBC # BLD AUTO: 4.24 10E6/UL (ref 4.4–5.9)
RBC URINE: <1 /HPF
SAO2 % BLDV: 87.7 % (ref 70–75)
SODIUM SERPL-SCNC: 137 MMOL/L (ref 135–145)
SODIUM SERPL-SCNC: 139 MMOL/L (ref 135–145)
SODIUM SERPL-SCNC: 139 MMOL/L (ref 135–145)
SP GR UR STRIP: 1.02 (ref 1–1.03)
SQUAMOUS EPITHELIAL: <1 /HPF
SYSTOLIC BLOOD PRESSURE - MUSE: NORMAL MMHG
T AXIS - MUSE: 47 DEGREES
URATE SERPL-MCNC: 7.4 MG/DL (ref 3.4–7)
UROBILINOGEN UR STRIP-MCNC: <2 MG/DL
VENTRICULAR RATE- MUSE: 50 BPM
WBC # BLD AUTO: 7.1 10E3/UL (ref 4–11)
WBC # BLD AUTO: 7.9 10E3/UL (ref 4–11)
WBC URINE: 1 /HPF

## 2025-01-29 PROCEDURE — 85014 HEMATOCRIT: CPT | Performed by: STUDENT IN AN ORGANIZED HEALTH CARE EDUCATION/TRAINING PROGRAM

## 2025-01-29 PROCEDURE — 80069 RENAL FUNCTION PANEL: CPT | Performed by: HOSPITALIST

## 2025-01-29 PROCEDURE — 120N000001 HC R&B MED SURG/OB

## 2025-01-29 PROCEDURE — 82040 ASSAY OF SERUM ALBUMIN: CPT | Performed by: INTERNAL MEDICINE

## 2025-01-29 PROCEDURE — 99207 PR APP CREDIT; MD BILLING SHARED VISIT: CPT

## 2025-01-29 PROCEDURE — 84550 ASSAY OF BLOOD/URIC ACID: CPT | Performed by: HOSPITALIST

## 2025-01-29 PROCEDURE — 85025 COMPLETE CBC W/AUTO DIFF WBC: CPT | Performed by: EMERGENCY MEDICINE

## 2025-01-29 PROCEDURE — 94640 AIRWAY INHALATION TREATMENT: CPT

## 2025-01-29 PROCEDURE — 250N000009 HC RX 250: Performed by: STUDENT IN AN ORGANIZED HEALTH CARE EDUCATION/TRAINING PROGRAM

## 2025-01-29 PROCEDURE — 250N000013 HC RX MED GY IP 250 OP 250 PS 637: Performed by: HOSPITALIST

## 2025-01-29 PROCEDURE — 999N000157 HC STATISTIC RCP TIME EA 10 MIN

## 2025-01-29 PROCEDURE — 250N000012 HC RX MED GY IP 250 OP 636 PS 637: Performed by: HOSPITALIST

## 2025-01-29 PROCEDURE — 36415 COLL VENOUS BLD VENIPUNCTURE: CPT | Performed by: EMERGENCY MEDICINE

## 2025-01-29 PROCEDURE — 82962 GLUCOSE BLOOD TEST: CPT

## 2025-01-29 PROCEDURE — 96365 THER/PROPH/DIAG IV INF INIT: CPT

## 2025-01-29 PROCEDURE — 36415 COLL VENOUS BLD VENIPUNCTURE: CPT | Performed by: HOSPITALIST

## 2025-01-29 PROCEDURE — 76770 US EXAM ABDO BACK WALL COMP: CPT

## 2025-01-29 PROCEDURE — 85018 HEMOGLOBIN: CPT | Performed by: EMERGENCY MEDICINE

## 2025-01-29 PROCEDURE — 84166 PROTEIN E-PHORESIS/URINE/CSF: CPT | Performed by: PATHOLOGY

## 2025-01-29 PROCEDURE — 250N000013 HC RX MED GY IP 250 OP 250 PS 637: Performed by: EMERGENCY MEDICINE

## 2025-01-29 PROCEDURE — 96376 TX/PRO/DX INJ SAME DRUG ADON: CPT

## 2025-01-29 PROCEDURE — 258N000003 HC RX IP 258 OP 636: Performed by: EMERGENCY MEDICINE

## 2025-01-29 PROCEDURE — 84156 ASSAY OF PROTEIN URINE: CPT | Performed by: HOSPITALIST

## 2025-01-29 PROCEDURE — 83735 ASSAY OF MAGNESIUM: CPT | Performed by: EMERGENCY MEDICINE

## 2025-01-29 PROCEDURE — 84132 ASSAY OF SERUM POTASSIUM: CPT | Performed by: HOSPITALIST

## 2025-01-29 PROCEDURE — 81001 URINALYSIS AUTO W/SCOPE: CPT | Performed by: EMERGENCY MEDICINE

## 2025-01-29 PROCEDURE — 258N000003 HC RX IP 258 OP 636: Performed by: STUDENT IN AN ORGANIZED HEALTH CARE EDUCATION/TRAINING PROGRAM

## 2025-01-29 PROCEDURE — 258N000001 HC RX 258: Performed by: EMERGENCY MEDICINE

## 2025-01-29 PROCEDURE — 250N000011 HC RX IP 250 OP 636: Mod: JZ | Performed by: EMERGENCY MEDICINE

## 2025-01-29 PROCEDURE — 80048 BASIC METABOLIC PNL TOTAL CA: CPT | Performed by: EMERGENCY MEDICINE

## 2025-01-29 PROCEDURE — 258N000001 HC RX 258: Performed by: STUDENT IN AN ORGANIZED HEALTH CARE EDUCATION/TRAINING PROGRAM

## 2025-01-29 PROCEDURE — 250N000013 HC RX MED GY IP 250 OP 250 PS 637

## 2025-01-29 PROCEDURE — 93005 ELECTROCARDIOGRAM TRACING: CPT | Performed by: EMERGENCY MEDICINE

## 2025-01-29 PROCEDURE — 99291 CRITICAL CARE FIRST HOUR: CPT | Mod: 25

## 2025-01-29 PROCEDURE — 82310 ASSAY OF CALCIUM: CPT | Performed by: INTERNAL MEDICINE

## 2025-01-29 PROCEDURE — 36415 COLL VENOUS BLD VENIPUNCTURE: CPT | Performed by: INTERNAL MEDICINE

## 2025-01-29 PROCEDURE — 99222 1ST HOSP IP/OBS MODERATE 55: CPT | Mod: FS | Performed by: INTERNAL MEDICINE

## 2025-01-29 PROCEDURE — 82805 BLOOD GASES W/O2 SATURATION: CPT | Performed by: INTERNAL MEDICINE

## 2025-01-29 PROCEDURE — 82306 VITAMIN D 25 HYDROXY: CPT | Performed by: INTERNAL MEDICINE

## 2025-01-29 PROCEDURE — 80069 RENAL FUNCTION PANEL: CPT | Performed by: EMERGENCY MEDICINE

## 2025-01-29 PROCEDURE — 82310 ASSAY OF CALCIUM: CPT | Performed by: STUDENT IN AN ORGANIZED HEALTH CARE EDUCATION/TRAINING PROGRAM

## 2025-01-29 PROCEDURE — 96375 TX/PRO/DX INJ NEW DRUG ADDON: CPT

## 2025-01-29 PROCEDURE — 258N000003 HC RX IP 258 OP 636: Performed by: HOSPITALIST

## 2025-01-29 PROCEDURE — 250N000012 HC RX MED GY IP 250 OP 636 PS 637: Performed by: STUDENT IN AN ORGANIZED HEALTH CARE EDUCATION/TRAINING PROGRAM

## 2025-01-29 PROCEDURE — 250N000011 HC RX IP 250 OP 636: Performed by: STUDENT IN AN ORGANIZED HEALTH CARE EDUCATION/TRAINING PROGRAM

## 2025-01-29 PROCEDURE — 99222 1ST HOSP IP/OBS MODERATE 55: CPT | Performed by: HOSPITALIST

## 2025-01-29 RX ORDER — ATORVASTATIN CALCIUM 10 MG/1
20 TABLET, FILM COATED ORAL DAILY
Status: DISCONTINUED | OUTPATIENT
Start: 2025-01-30 | End: 2025-01-30 | Stop reason: HOSPADM

## 2025-01-29 RX ORDER — ONDANSETRON 2 MG/ML
4 INJECTION INTRAMUSCULAR; INTRAVENOUS EVERY 6 HOURS PRN
Status: DISCONTINUED | OUTPATIENT
Start: 2025-01-29 | End: 2025-01-30 | Stop reason: HOSPADM

## 2025-01-29 RX ORDER — TRIAMTERENE AND HYDROCHLOROTHIAZIDE 37.5; 25 MG/1; MG/1
1 CAPSULE ORAL EVERY MORNING
COMMUNITY
End: 2025-01-30

## 2025-01-29 RX ORDER — CALCIUM CARBONATE 500 MG/1
1000 TABLET, CHEWABLE ORAL 4 TIMES DAILY PRN
Status: DISCONTINUED | OUTPATIENT
Start: 2025-01-29 | End: 2025-01-30 | Stop reason: HOSPADM

## 2025-01-29 RX ORDER — ACETAMINOPHEN 500 MG
500-1000 TABLET ORAL EVERY 6 HOURS PRN
Status: DISCONTINUED | OUTPATIENT
Start: 2025-01-29 | End: 2025-01-30 | Stop reason: HOSPADM

## 2025-01-29 RX ORDER — HYDRALAZINE HYDROCHLORIDE 10 MG/1
10 TABLET, FILM COATED ORAL 2 TIMES DAILY PRN
Status: DISCONTINUED | OUTPATIENT
Start: 2025-01-29 | End: 2025-01-30 | Stop reason: HOSPADM

## 2025-01-29 RX ORDER — CALCIUM GLUCONATE 20 MG/ML
1 INJECTION, SOLUTION INTRAVENOUS ONCE
Status: COMPLETED | OUTPATIENT
Start: 2025-01-29 | End: 2025-01-29

## 2025-01-29 RX ORDER — AMOXICILLIN 250 MG
1 CAPSULE ORAL 2 TIMES DAILY PRN
Status: DISCONTINUED | OUTPATIENT
Start: 2025-01-29 | End: 2025-01-30 | Stop reason: HOSPADM

## 2025-01-29 RX ORDER — ALBUTEROL SULFATE 5 MG/ML
10 SOLUTION RESPIRATORY (INHALATION) ONCE
Status: COMPLETED | OUTPATIENT
Start: 2025-01-29 | End: 2025-01-29

## 2025-01-29 RX ORDER — ATENOLOL 25 MG/1
50 TABLET ORAL DAILY
Status: DISCONTINUED | OUTPATIENT
Start: 2025-01-30 | End: 2025-01-30 | Stop reason: HOSPADM

## 2025-01-29 RX ORDER — NICOTINE POLACRILEX 4 MG
15-30 LOZENGE BUCCAL
Status: DISCONTINUED | OUTPATIENT
Start: 2025-01-29 | End: 2025-01-29

## 2025-01-29 RX ORDER — ATENOLOL 100 MG/1
100 TABLET ORAL DAILY
COMMUNITY
End: 2025-01-30

## 2025-01-29 RX ORDER — GABAPENTIN 300 MG/1
300 CAPSULE ORAL 3 TIMES DAILY
COMMUNITY

## 2025-01-29 RX ORDER — AMOXICILLIN 250 MG
2 CAPSULE ORAL 2 TIMES DAILY PRN
Status: DISCONTINUED | OUTPATIENT
Start: 2025-01-29 | End: 2025-01-30 | Stop reason: HOSPADM

## 2025-01-29 RX ORDER — MELOXICAM 15 MG/1
15 TABLET ORAL DAILY
COMMUNITY
End: 2025-01-30

## 2025-01-29 RX ORDER — PREDNISONE 20 MG/1
20 TABLET ORAL DAILY
Status: DISCONTINUED | OUTPATIENT
Start: 2025-01-29 | End: 2025-01-30 | Stop reason: HOSPADM

## 2025-01-29 RX ORDER — TAMSULOSIN HYDROCHLORIDE 0.4 MG/1
0.4 CAPSULE ORAL DAILY
Status: DISCONTINUED | OUTPATIENT
Start: 2025-01-30 | End: 2025-01-29

## 2025-01-29 RX ORDER — LORAZEPAM 0.5 MG/1
0.5 TABLET ORAL EVERY 4 HOURS PRN
Status: DISCONTINUED | OUTPATIENT
Start: 2025-01-29 | End: 2025-01-30 | Stop reason: HOSPADM

## 2025-01-29 RX ORDER — OMEGA-3/DHA/EPA/FISH OIL 60 MG-90MG
1000 CAPSULE ORAL DAILY
COMMUNITY

## 2025-01-29 RX ORDER — DEXTROSE MONOHYDRATE 25 G/50ML
25-50 INJECTION, SOLUTION INTRAVENOUS
Status: DISCONTINUED | OUTPATIENT
Start: 2025-01-29 | End: 2025-01-30 | Stop reason: HOSPADM

## 2025-01-29 RX ORDER — ONDANSETRON 4 MG/1
4 TABLET, ORALLY DISINTEGRATING ORAL EVERY 6 HOURS PRN
Status: DISCONTINUED | OUTPATIENT
Start: 2025-01-29 | End: 2025-01-30 | Stop reason: HOSPADM

## 2025-01-29 RX ORDER — HYDROCHLOROTHIAZIDE 25 MG/1
25 TABLET ORAL DAILY
Status: DISCONTINUED | OUTPATIENT
Start: 2025-01-30 | End: 2025-01-30 | Stop reason: HOSPADM

## 2025-01-29 RX ORDER — FUROSEMIDE 10 MG/ML
40 INJECTION INTRAMUSCULAR; INTRAVENOUS ONCE
Status: COMPLETED | OUTPATIENT
Start: 2025-01-29 | End: 2025-01-29

## 2025-01-29 RX ORDER — LISINOPRIL 40 MG/1
40 TABLET ORAL DAILY
COMMUNITY
End: 2025-01-30

## 2025-01-29 RX ORDER — DEXTROSE MONOHYDRATE 25 G/50ML
25 INJECTION, SOLUTION INTRAVENOUS ONCE
Status: COMPLETED | OUTPATIENT
Start: 2025-01-29 | End: 2025-01-29

## 2025-01-29 RX ORDER — SODIUM CHLORIDE 9 MG/ML
INJECTION, SOLUTION INTRAVENOUS CONTINUOUS
Status: DISCONTINUED | OUTPATIENT
Start: 2025-01-29 | End: 2025-01-30

## 2025-01-29 RX ORDER — GUAIFENESIN/DEXTROMETHORPHAN 100-10MG/5
10 SYRUP ORAL EVERY 4 HOURS PRN
Status: DISCONTINUED | OUTPATIENT
Start: 2025-01-29 | End: 2025-01-30 | Stop reason: HOSPADM

## 2025-01-29 RX ORDER — LIDOCAINE 40 MG/G
CREAM TOPICAL
Status: DISCONTINUED | OUTPATIENT
Start: 2025-01-29 | End: 2025-01-30 | Stop reason: HOSPADM

## 2025-01-29 RX ORDER — AMOXICILLIN 250 MG
1 CAPSULE ORAL 2 TIMES DAILY PRN
Status: DISCONTINUED | OUTPATIENT
Start: 2025-01-29 | End: 2025-01-29

## 2025-01-29 RX ORDER — NICOTINE POLACRILEX 4 MG
15-30 LOZENGE BUCCAL
Status: DISCONTINUED | OUTPATIENT
Start: 2025-01-29 | End: 2025-01-30 | Stop reason: HOSPADM

## 2025-01-29 RX ORDER — AMOXICILLIN 250 MG
2 CAPSULE ORAL DAILY PRN
Status: DISCONTINUED | OUTPATIENT
Start: 2025-01-29 | End: 2025-01-29

## 2025-01-29 RX ORDER — ACETAMINOPHEN 500 MG
500-1000 TABLET ORAL EVERY 6 HOURS PRN
COMMUNITY

## 2025-01-29 RX ORDER — PROCHLORPERAZINE MALEATE 5 MG/1
5 TABLET ORAL EVERY 6 HOURS PRN
Status: DISCONTINUED | OUTPATIENT
Start: 2025-01-29 | End: 2025-01-30 | Stop reason: HOSPADM

## 2025-01-29 RX ORDER — DEXTROSE MONOHYDRATE 25 G/50ML
25-50 INJECTION, SOLUTION INTRAVENOUS
Status: DISCONTINUED | OUTPATIENT
Start: 2025-01-29 | End: 2025-01-29

## 2025-01-29 RX ORDER — GABAPENTIN 100 MG/1
100 CAPSULE ORAL 3 TIMES DAILY
Status: DISCONTINUED | OUTPATIENT
Start: 2025-01-29 | End: 2025-01-30 | Stop reason: HOSPADM

## 2025-01-29 RX ORDER — TAMSULOSIN HYDROCHLORIDE 0.4 MG/1
0.8 CAPSULE ORAL DAILY
Status: DISCONTINUED | OUTPATIENT
Start: 2025-01-30 | End: 2025-01-30 | Stop reason: HOSPADM

## 2025-01-29 RX ORDER — LACTOBACILLUS RHAMNOSUS GG 10B CELL
1 CAPSULE ORAL DAILY
COMMUNITY

## 2025-01-29 RX ADMIN — DEXTROSE MONOHYDRATE 300 ML: 100 INJECTION, SOLUTION INTRAVENOUS at 20:15

## 2025-01-29 RX ADMIN — SODIUM CHLORIDE: 9 INJECTION, SOLUTION INTRAVENOUS at 10:55

## 2025-01-29 RX ADMIN — SODIUM CHLORIDE: 9 INJECTION, SOLUTION INTRAVENOUS at 19:55

## 2025-01-29 RX ADMIN — DEXTROSE MONOHYDRATE 25 G: 25 INJECTION, SOLUTION INTRAVENOUS at 08:54

## 2025-01-29 RX ADMIN — PREDNISONE 20 MG: 20 TABLET ORAL at 11:44

## 2025-01-29 RX ADMIN — SODIUM CHLORIDE 1000 ML: 9 INJECTION, SOLUTION INTRAVENOUS at 09:04

## 2025-01-29 RX ADMIN — SODIUM ZIRCONIUM CYCLOSILICATE 10 G: 10 POWDER, FOR SUSPENSION ORAL at 14:11

## 2025-01-29 RX ADMIN — SODIUM BICARBONATE 50 MEQ: 84 INJECTION INTRAVENOUS at 20:07

## 2025-01-29 RX ADMIN — CALCIUM GLUCONATE 1 G: 20 INJECTION, SOLUTION INTRAVENOUS at 08:40

## 2025-01-29 RX ADMIN — ALBUTEROL SULFATE 10 MG: 2.5 SOLUTION RESPIRATORY (INHALATION) at 19:48

## 2025-01-29 RX ADMIN — DEXTROSE MONOHYDRATE 25 G: 25 INJECTION, SOLUTION INTRAVENOUS at 19:56

## 2025-01-29 RX ADMIN — GABAPENTIN 100 MG: 100 CAPSULE ORAL at 20:14

## 2025-01-29 RX ADMIN — SODIUM CHLORIDE 8.3 UNITS: 9 INJECTION, SOLUTION INTRAVENOUS at 19:57

## 2025-01-29 RX ADMIN — SODIUM ZIRCONIUM CYCLOSILICATE 10 G: 10 POWDER, FOR SUSPENSION ORAL at 20:15

## 2025-01-29 RX ADMIN — HUMAN INSULIN 8.3 UNITS: 100 INJECTION, SOLUTION SUBCUTANEOUS at 09:06

## 2025-01-29 RX ADMIN — FUROSEMIDE 40 MG: 10 INJECTION, SOLUTION INTRAMUSCULAR; INTRAVENOUS at 20:02

## 2025-01-29 RX ADMIN — Medication 15 G: at 10:38

## 2025-01-29 RX ADMIN — HYDRALAZINE HYDROCHLORIDE 10 MG: 10 TABLET ORAL at 14:11

## 2025-01-29 RX ADMIN — DEXTROSE MONOHYDRATE 300 ML: 100 INJECTION, SOLUTION INTRAVENOUS at 08:59

## 2025-01-29 RX ADMIN — GABAPENTIN 100 MG: 100 CAPSULE ORAL at 14:11

## 2025-01-29 RX ADMIN — SODIUM ZIRCONIUM CYCLOSILICATE 10 G: 10 POWDER, FOR SUSPENSION ORAL at 10:49

## 2025-01-29 ASSESSMENT — ACTIVITIES OF DAILY LIVING (ADL)
ADLS_ACUITY_SCORE: 41

## 2025-01-29 ASSESSMENT — COLUMBIA-SUICIDE SEVERITY RATING SCALE - C-SSRS
6. HAVE YOU EVER DONE ANYTHING, STARTED TO DO ANYTHING, OR PREPARED TO DO ANYTHING TO END YOUR LIFE?: NO
1. IN THE PAST MONTH, HAVE YOU WISHED YOU WERE DEAD OR WISHED YOU COULD GO TO SLEEP AND NOT WAKE UP?: NO
2. HAVE YOU ACTUALLY HAD ANY THOUGHTS OF KILLING YOURSELF IN THE PAST MONTH?: NO

## 2025-01-29 NOTE — H&P
[facility] MEDICINE ADMISSION HISTORY AND PHYSICAL     Brief Synopsis:     Maxi Hoffman is a 74 year old male who presented due to abnormal labs drawn as an outpatient.  Found to have very high potassium, acute kidney injury.    Medical history is notable for rheumatoid arthritis, hypertension, back pain.    His home medications are Tylenol, atenolol, atorvastatin, gabapentin 300 3 times daily, lisinopril, meloxicam, Flomax, triamterene/hydrochlorothiazide.  Outpatient records indicate he had been taking ibuprofen as well.    Initial evaluation revealed elevated blood pressures up to the 170s systolically, heart rate slow in the 40s at rest.  Normal respirations and oxygen saturation.  Labs notable for potassium of 6.6, creatinine 2.1, unremarkable CBC.  Glucose was low at 60.  Urinalysis negative for infection.  Renal ultrasound negative for obstruction.  EKG was sinus rhythm, slightly peaked T waves.    Initial treatment included calcium gluconate, dextrose, insulin, 1 L of normal saline, Lokelma.  Nephrology consulted in the emergency department.    Assessment and Plan:  Acute kidney injury  Chronic hyperkalemia  Appreciate renal recommendations  Renal ultrasound unremarkable  Stop triamterene, lisinopril  Monitor potassium after initial ED treatments  Continue telemetry  Stop NSAIDs  Follow potassium every 4 hours and treat as needed hyperkalemia protocol  Monitor blood sugars, give dextrose as needed  Lokelma started 3 times daily    Essential hypertension  As an outpatient is on atenolol, triamterene/hydrochlorothiazide, lisinopril  Discontinue triamterene, lisinopril  Decreased dose of atenolol given resting heart rate in the 40s    Rheumatoid arthritis  Takes meloxicam, ibuprofen for this  Avoid NSAIDs for now  Still with lots of pain, will trial low-dose prednisone  Recommend outpatient follow-up with rheumatology    Chronic low back pain  Takes gabapentin 300 mg 3 times  "daily  Decreased to 100 mg 3 times daily for now given acute kidney injury    Clinically Significant Risk Factors Present on Admission        # Hyperkalemia: Highest K = 6.6 mmol/L in last 2 days, will monitor as appropriate   # Hyperchloremia: Highest Cl = 110 mmol/L in last 2 days, will monitor as appropriate              # Hypertension: Home medication list includes antihypertensive(s)           # Overweight: Estimated body mass index is 26.33 kg/m  as calculated from the following:    Height as of this encounter: 1.778 m (5' 10\").    Weight as of this encounter: 83.2 kg (183 lb 8 oz).                DVTP: Mechanical Prophylaxis/ Sequential Compression Devices  Code Status: No Order  Disposition: Inpatient   Diet: Regular  Fluids: Normal saline at 100/h    Disposition Plan      Expected Discharge Date: 01/31/2025               Chief Complaint High potassium     HISTORY   Maxi Hoffman is a 74 year old male who presented with concerns of high potassium noticed with outpatient blood draw.    Per ED provider:  Maxi Hoffman is a 74 year old male with a pertinent history of ureteral stone, ureteral colic who presents to this ED via private car for evaluation of abnormal labs.     Patient states he was referred to the ED because his potassium level was 6.9 yesterday. Patient states he felt fine, but his potassium level was high in December as well. This prompted his recheck yesterday.      Patient does not endorse syncopal episodes, new pain, or any other concerns at this time.    Denies chest pain, palpitations, shortness of breath.  No nausea, vomiting, diarrhea, urinary complaints.  No lower extremity edema.  Has a lot of pain in his right foot that he associates with rheumatoid arthritis.  Was taking regular ibuprofen and meloxicam for a couple of weeks.  Says the meloxicam was quite helpful.  Has not seen rheumatology in a long time.  Past Medical History     No past medical history on file.     Surgical " History     Past Surgical History:   Procedure Laterality Date    COMBINED CYSTOSCOPY, INSERT STENT URETER(S) Left 1/4/2019    Procedure: CYSTOSCOPY, LEFT URETEROSCOPY LASER LITHOTRIPSY STENT INSERTION;  Surgeon: Daniel Dean MD;  Location: [facility];  Service: Urology    OTHER SURGICAL HISTORY      never had surgery     Family History      Family History   Problem Relation Age of Onset    Cancer Mother         breast    Diabetes Father     Arthritis Father     Urolithiasis Brother       Social History      Social History     Tobacco Use    Smoking status: Never    Smokeless tobacco: Never   Substance Use Topics    Alcohol use: No    Drug use: No      Allergies     Allergies   Allergen Reactions    Levaquin [Levofloxacin] Itching     Prior to Admission Medications      Prior to Admission Medications   Prescriptions Last Dose Informant Patient Reported? Taking?   APPLE CIDER VINEGAR PO 1/29/2025 Morning  Yes Yes   Sig: Take 1 tablet by mouth daily.   acetaminophen (TYLENOL) 500 MG tablet 1/28/2025 Bedtime  Yes Yes   Sig: Take 500-1,000 mg by mouth every 6 hours as needed for mild pain.   atenolol (TENORMIN) 100 MG tablet 1/29/2025 Morning  Yes Yes   Sig: Take 100 mg by mouth daily.   atorvastatin (LIPITOR) 20 MG tablet 1/29/2025 Morning  Yes Yes   Sig: Take 20 mg by mouth daily.   fish oil-omega-3 fatty acids 500 MG capsule 1/29/2025 Morning  Yes Yes   Sig: Take 1,000 mg by mouth daily.   gabapentin (NEURONTIN) 300 MG capsule 1/29/2025 Morning  Yes Yes   Sig: Take 300 mg by mouth 3 times daily.   lactobacillus rhamnosus, GG, (CULTURELL) capsule 1/29/2025 Morning  Yes Yes   Sig: Take 1 capsule by mouth daily.   lisinopril (ZESTRIL) 40 MG tablet 1/29/2025 Morning  Yes Yes   Sig: Take 40 mg by mouth daily.   meloxicam (MOBIC) 15 MG tablet 1/29/2025 Morning  Yes Yes   Sig: Take 15 mg by mouth daily.   tamsulosin (FLOMAX) 0.4 mg cap 1/29/2025 Morning  No Yes   Sig: [TAMSULOSIN (FLOMAX) 0.4 MG CAP] Take  1 capsule (0.4 mg total) by mouth daily.   triamterene-HCTZ (DYAZIDE) 37.5-25 MG capsule 1/29/2025 Morning  Yes Yes   Sig: Take 1 capsule by mouth every morning.      Facility-Administered Medications: None      Review of Systems     A 12 point comprehensive review of systems was negative except as noted above in HPI.    PHYSICAL EXAMINATION     Vitals      Temp:  [97.3  F (36.3  C)] 97.3  F (36.3  C)  Pulse:  [44-59] 56  Resp:  [12-20] 12  BP: (155-183)/() 155/71  SpO2:  [98 %-100 %] 99 %    Examination   Physical Exam:    Gen: no acute distress, comfortable, alert, pleasant  ENT: no scleral icterus  Pulm: lungs are clear without wheezing, crackles, breathing comfortably at rest  CV: regular rate and rhythm, no significant lower extremity pitting edema  GI: abdomen is soft, non-tender, non-distended with active bowel sounds.  MSK: no obvious deformities of the extremities, no obvious synovitis of the foot, has some mild redness on the dorsum of the right foot, no MTP swelling or erythema.  Derm: Not pale, no jaundice  Psych: appropriate affect, anxious      Pertinent Radiology     Radiology Results: No results found for this or any previous visit (from the past 24 hours).  EKG Results: personally reviewed.  Sinus with mildly peaked T waves.    Seth Mensah DO  [facility]  [facility]  [facility]   Phone: #[phone]

## 2025-01-29 NOTE — PHARMACY-ADMISSION MEDICATION HISTORY
Pharmacist Admission Medication History    Admission medication history is complete. The information provided in this note is only as accurate as the sources available at the time of the update.    Information Source(s): Patient and CareEverywhere/SureScripts via in-person    Pertinent Information: patient was taking ibuprofen 400 mg nightly AND meloxicam 15 mg daily.  He stopped taking ibuprofen ~Monday of this week    Changes made to PTA medication list:  Added: probiotic, apple cider vinegar, atenolol, gabapentin, lisinopril, maxide, tylenol, fish oil  Deleted: celebrex, ibuprofen  Changed: None    Allergies reviewed with patient and updates made in EHR: yes    Medication History Completed By: Patti Restrepo [facility] 1/29/2025 9:12 AM    PTA Med List   Medication Sig Last Dose/Taking    acetaminophen (TYLENOL) 500 MG tablet Take 500-1,000 mg by mouth every 6 hours as needed for mild pain. 1/28/2025 Bedtime    APPLE CIDER VINEGAR PO Take 1 tablet by mouth daily. 1/29/2025 Morning    atenolol (TENORMIN) 100 MG tablet Take 100 mg by mouth daily. 1/29/2025 Morning    atorvastatin (LIPITOR) 20 MG tablet Take 20 mg by mouth daily. 1/29/2025 Morning    fish oil-omega-3 fatty acids 500 MG capsule Take 1,000 mg by mouth daily. 1/29/2025 Morning    gabapentin (NEURONTIN) 300 MG capsule Take 300 mg by mouth 3 times daily. 1/29/2025 Morning    lactobacillus rhamnosus, GG, (CULTURELL) capsule Take 1 capsule by mouth daily. 1/29/2025 Morning    lisinopril (ZESTRIL) 40 MG tablet Take 40 mg by mouth daily. 1/29/2025 Morning    meloxicam (MOBIC) 15 MG tablet Take 15 mg by mouth daily. 1/29/2025 Morning    tamsulosin (FLOMAX) 0.4 mg cap [TAMSULOSIN (FLOMAX) 0.4 MG CAP] Take 1 capsule (0.4 mg total) by mouth daily. 1/29/2025 Morning    triamterene-HCTZ (DYAZIDE) 37.5-25 MG capsule Take 1 capsule by mouth every morning. 1/29/2025 Morning

## 2025-01-29 NOTE — ED TRIAGE NOTES
Pt c/o abnormal labs. He had his lab drawn yesterday and had high potassium. Pt has had some higher potassium readings in the past. Denies any new palpitations or chest pains.      Triage Assessment (Adult)       Row Name 01/29/25 0722          Triage Assessment    Airway WDL WDL        Respiratory WDL    Respiratory WDL WDL        Skin Circulation/Temperature WDL    Skin Circulation/Temperature WDL WDL        Cardiac WDL    Cardiac WDL X  HTN        Peripheral/Neurovascular WDL    Peripheral Neurovascular WDL WDL     Capillary Refill, General less than/equal to 3 secs        Cognitive/Neuro/Behavioral WDL    Cognitive/Neuro/Behavioral WDL WDL        [place] Coma Scale    Best Eye Response 4-->(E4) spontaneous     Best Motor Response 6-->(M6) obeys commands     Best Verbal Response 5-->(V5) oriented     María Coma Scale Score 15

## 2025-01-29 NOTE — ED NOTES
Expected Patient Referral to ED  6:31 AM    Referring Clinic/Provider:  Daniel riddle    Reason for referral/Clinical facts:  Increased potassium on blood draw yesterday.  Called patient and told to recheck    Recommendations provided:  Send to ED for further evaluation    Caller was informed that this institution does possess the capabilities and/or resources to provide for patient and should be transferred to our facility.    Discussed that if direct admit is sought and any hurdles are encountered, this ED would be happy to see the patient and evaluate.    Informed caller that recommendations provided are recommendations based only on the facts provided and that they responsible to accept or reject the advice, or to seek a formal in person consultation as needed and that this ED will see/treat patient should they arrive.      Jian Reece MD  [facility] EMERGENCY ROOM  [address] [facility] [address]  [phone]       Jian Reece MD  01/29/25 0632

## 2025-01-29 NOTE — ED PROVIDER NOTES
EMERGENCY DEPARTMENT ENCOUNTER      NAME: Maxi Hoffman  AGE: 74 year old male  YOB: 1950  MRN: [id number]  EVALUATION DATE & TIME: 2025  7:24 AM    PCP: Lesa Ng    ED PROVIDER: Star Huddleston M.D.      Chief Complaint   Patient presents with    Abnormal Labs         FINAL IMPRESSION:  1. Hyperkalemia          ED COURSE & MEDICAL DECISION MAKIN year old male presents to the Emergency Department for evaluation of abnormal labs.  Patient referred from clinic after outpatient lab draws revealed renal insufficiency and hyperkalemia.  Labs this morning do confirm an elevated potassium level to 6.6, was 6.9 yesterday, along with an associated acute kidney injury with a creatinine elevated greater than 2.  Last year his baseline creatinine had been normal until December.  He does have a history of hypertension and has been using NSAIDs as recently as over the last couple of weeks.  He arrives to the emergency department nontoxic-appearing, essentially asymptomatic.  He is a bit bradycardic at times.  His EKG does have some appearance of peaked T waves.  He was given a single dose of IV calcium along with insulin and dextrose to shift his hyperkalemia.  He was also given some IV fluids.  Urine analysis shows hyaline casts, mid-range specific gravity.  Case discussed with nephrology, Dr Jim.  Recommended initiation of Lokelma.  Will obtain renal ultrasound and admit for continuing management of acute kidney injury and hyperkalemia.  Discussed case with hospitalist Dr. Mensah.    At the conclusion of the encounter I discussed the results of all of the tests and the disposition. The questions were answered. The patient or family acknowledged understanding and was agreeable with the care plan.         Medical Decision Making  Obtained supplemental history:Supplemental history obtained?: No  Reviewed external records: External records reviewed?: Documented in chart and Outpatient  Record: Clinic visit from yesterday  Care impacted by chronic illness:Hypertension  Did you consider but not order tests?: Work up considered but not performed and documented in chart, if applicable  Did you interpret images independently?: Independent interpretation of ECG and images noted in documentation, when applicable.  Consultation discussion with other provider:Did you involve another provider (consultant, , pharmacy, etc.)?: I discussed the care with another health care provider, see documentation for details.  Admit.    MIPS: Not Applicable      Critical Care  Performed by: Star Huddleston MD  Authorized by: Star Huddleston MD     Total critical care time: 38 minutes  Critical care time was exclusive of separately billable procedures and treating other patients.  Critical care was necessary to treat or prevent imminent or life-threatening deterioration of the following conditions: Hyperkalemia with associated EKG changes requiring shifting and admission to the hospital on telemetry.  Critical care was time spent personally by me on the following activities: development of treatment plan with patient or surrogate, discussions with consultants, examination of patient, evaluation of patient's response to treatment, obtaining history from patient or surrogate, ordering and performing treatments and interventions, ordering and review of laboratory studies, ordering and review of radiographic studies and re-evaluation of patient's condition, this excludes any separately billable procedures.            MEDICATIONS GIVEN IN THE EMERGENCY:  Medications   glucose gel 15-30 g (has no administration in time range)     Or   dextrose 50 % injection 25-50 mL (has no administration in time range)     Or   glucagon injection 1 mg (has no administration in time range)   dextrose 10% BOLUS 300 mL (300 mLs Intravenous $New Bag 1/29/25 3599)   sodium zirconium cyclosilicate (LOKELMA) packet 10 g (has no administration in  time range)   dextrose 50 % injection 25 g (25 g Intravenous $Given 1/29/25 0871)   insulin regular 1 unit/mL injection 8.3 Units (8.3 Units Intravenous $Given 1/29/25 0906)   calcium gluconate 1 g in 50 mL in sodium chloride intermittent infusion (1 g Intravenous $Given 1/29/25 0855)   sodium chloride 0.9% BOLUS 1,000 mL (1,000 mLs Intravenous $New Bag 1/29/25 0904)       NEW PRESCRIPTIONS STARTED AT TODAY'S ER VISIT  New Prescriptions    No medications on file          =================================================================    HPI    Patient information was obtained from: Patient    Use of : N/A         Maxi Hoffman is a 74 year old male with a pertinent history of ureteral stone, ureteral colic who presents to this ED via private car for evaluation of abnormal labs.    Patient states he was referred to the ED because his potassium level was 6.9 yesterday. Patient states he felt fine, but his potassium level was high in December as well. This prompted his recheck yesterday.     Patient does not endorse syncopal episodes, new pain, or any other concerns at this time.    Per chart review:  Patient was seen on 01/28/2025 following up with his PCP about foot pain. Patient had BMP ran and EKG performed. Patient also had imaging done. Xray as unremarkable, as was the EKG. Patients BMP showed a potassium level of 6.9, resulting after patient was discharged. Patient was contacted and instructed to report to the ED based on his high Potassium level.        REVIEW OF SYSTEMS   All systems reviewed and negative except as noted in HPI.    PAST MEDICAL HISTORY:  History reviewed. No pertinent past medical history.    PAST SURGICAL HISTORY:  Past Surgical History:   Procedure Laterality Date    COMBINED CYSTOSCOPY, INSERT STENT URETER(S) Left 1/4/2019    Procedure: CYSTOSCOPY, LEFT URETEROSCOPY LASER LITHOTRIPSY STENT INSERTION;  Surgeon: Daniel Dean MD;  Location: [facility] OR;   Service: Urology    OTHER SURGICAL HISTORY      never had surgery           CURRENT MEDICATIONS:    Current Facility-Administered Medications   Medication Dose Route Frequency Provider Last Rate Last Admin    dextrose 10% BOLUS 300 mL  300 mL Intravenous Once Star Huddleston MD 75 mL/hr at 01/29/25 0859 300 mL at 01/29/25 0859    glucose gel 15-30 g  15-30 g Oral Q15 Min PRN Star Huddleston MD        Or    dextrose 50 % injection 25-50 mL  25-50 mL Intravenous Q15 Min PRN Star Huddleston MD        Or    glucagon injection 1 mg  1 mg Subcutaneous Q15 Min PRN Star Huddleston MD        sodium zirconium cyclosilicate (LOKELMA) packet 10 g  10 g Oral TID Star Huddleston MD         Current Outpatient Medications   Medication Sig Dispense Refill    acetaminophen (TYLENOL) 500 MG tablet Take 500-1,000 mg by mouth every 6 hours as needed for mild pain.      APPLE CIDER VINEGAR PO Take 1 tablet by mouth daily.      atenolol (TENORMIN) 100 MG tablet Take 100 mg by mouth daily.      atorvastatin (LIPITOR) 20 MG tablet Take 20 mg by mouth daily.      fish oil-omega-3 fatty acids 500 MG capsule Take 1,000 mg by mouth daily.      gabapentin (NEURONTIN) 300 MG capsule Take 300 mg by mouth 3 times daily.      lactobacillus rhamnosus, GG, (CULTURELL) capsule Take 1 capsule by mouth daily.      lisinopril (ZESTRIL) 40 MG tablet Take 40 mg by mouth daily.      meloxicam (MOBIC) 15 MG tablet Take 15 mg by mouth daily.      tamsulosin (FLOMAX) 0.4 mg cap [TAMSULOSIN (FLOMAX) 0.4 MG CAP] Take 1 capsule (0.4 mg total) by mouth daily. 90 capsule 3    triamterene-HCTZ (DYAZIDE) 37.5-25 MG capsule Take 1 capsule by mouth every morning.           ALLERGIES:  Allergies   Allergen Reactions    Levaquin [Levofloxacin] Itching       FAMILY HISTORY:  Family History   Problem Relation Age of Onset    Cancer Mother         breast    Diabetes Father     Arthritis Father     Urolithiasis Brother        SOCIAL HISTORY:   Social History  "    Socioeconomic History    Marital status:      Spouse name: None    Number of children: None    Years of education: None    Highest education level: None   Tobacco Use    Smoking status: Never    Smokeless tobacco: Never   Substance and Sexual Activity    Alcohol use: No    Drug use: No     Social Drivers of Health     Financial Resource Strain: Low Risk  (12/11/2024)    Received from [facility]    Financial Resource Strain     Difficulty of Paying Living Expenses: 3   Food Insecurity: No Food Insecurity (12/11/2024)    [facility]    Food Insecurity     Do you worry your food will run out before you are able to buy more?: 1   Transportation Needs: No Transportation Needs (12/11/2024)    [facility]    Transportation Needs     Does lack of transportation keep you from medical appointments?: 1     Does lack of transportation keep you from work, meetings or getting things that you need?: 1   Social Connections: Socially Integrated (12/11/2024)    Received from [facility]    Social Connections     Do you often feel lonely or isolated from those around you?: 0   Housing Stability: Low Risk  (12/11/2024)    Received from [place] [facility]    Housing Stability     What is your housing situation today?: 1       VITALS:  BP (!) 155/71   Pulse 56   Temp 97.3  F (36.3  C) (Temporal)   Resp 12   Ht 1.778 m (5' 10\")   Wt 83.2 kg (183 lb 8 oz)   SpO2 99%   BMI 26.33 kg/m      PHYSICAL EXAM    Constitutional: Well developed, Well nourished, NAD.  HENT: Normocephalic, Atraumatic. Neck Supple.  Eyes: EOMI, Conjunctiva normal.  Respiratory: Breathing comfortably on room air. Speaks full sentences easily. Lungs clear to ascultation.  Cardiovascular: Normal heart rate, Regular rhythm. No peripheral edema.  Abdomen: Soft, " nontender  Musculoskeletal: Good range of motion in all major joints. No major deformities noted.  Integument: Warm, Dry.  Neurologic: Alert & awake, Normal motor function, Normal sensory function, No focal deficits noted.   Psychiatric: Cooperative. Affect appropriate.     LAB:  All pertinent labs reviewed and interpreted.  Labs Ordered and Resulted from Time of ED Arrival to Time of ED Departure   BASIC METABOLIC PANEL - Abnormal       Result Value    Sodium 139      Potassium 6.6 (*)     Chloride 110 (*)     Carbon Dioxide (CO2) 22      Anion Gap 7      Urea Nitrogen 49.2 (*)     Creatinine 2.12 (*)     GFR Estimate 32 (*)     Calcium 10.0      Glucose 116 (*)    ROUTINE UA WITH MICROSCOPIC REFLEX TO CULTURE - Abnormal    Color Urine Light Yellow      Appearance Urine Clear      Glucose Urine Negative      Bilirubin Urine Negative      Ketones Urine Negative      Specific Gravity Urine 1.022      Blood Urine Negative      pH Urine 6.0      Protein Albumin Urine 10 (*)     Urobilinogen Urine <2.0      Nitrite Urine Negative      Leukocyte Esterase Urine Negative      Mucus Urine Present (*)     RBC Urine <1      WBC Urine 1      Squamous Epithelials Urine <1      Hyaline Casts Urine 16 (*)    CBC WITH PLATELETS AND DIFFERENTIAL - Abnormal    WBC Count 7.1      RBC Count 4.24 (*)     Hemoglobin 13.3      Hematocrit 40.5      MCV 96      MCH 31.4      MCHC 32.8      RDW 13.8      Platelet Count 225      % Neutrophils 69      % Lymphocytes 20      % Monocytes 9      % Eosinophils 2      % Basophils 0      % Immature Granulocytes 0      NRBCs per 100 WBC 0      Absolute Neutrophils 4.9      Absolute Lymphocytes 1.4      Absolute Monocytes 0.6      Absolute Eosinophils 0.1      Absolute Basophils 0.0      Absolute Immature Granulocytes 0.0      Absolute NRBCs 0.0     GLUCOSE BY METER - Abnormal    GLUCOSE BY METER POCT 157 (*)    MAGNESIUM - Normal    Magnesium 2.3     GLUCOSE BY METER - Normal    GLUCOSE BY METER POCT  97     GLUCOSE MONITOR NURSING POCT   GLUCOSE MONITOR NURSING POCT   POTASSIUM   POTASSIUM   GLUCOSE MONITOR NURSING POCT       RADIOLOGY:  Reviewed all pertinent imaging. Please see official radiology report.  US Renal Complete Non-Vascular    (Results Pending)       EKG:    Performed at: 07:43 01/29/2025    Impression: Sinus bradycardia, peaked t-waves consistent with hyperkalemia    Rate: 50 BPM  Rhythm: Sinus Bradycardia  Axis: 56 34 47  NH Interval: 174 ms  QRS Interval: 90 ms  QTc Interval: 388/353 ms  ST Changes: T waves somewhat peaked  Comparison: No previous ECG available.    I have independently reviewed and interpreted the EKG(s) documented above.      I, Michael Marcano, am serving as a scribe to document services personally performed by Dr. Star Huddleston, based on my observation and the provider's statements to me. I, Star Huddleston MD attest that Michael Marcano, is acting in a scribe capacity, has observed my performance of the services and has documented them in accordance with my direction.    Star Huddleston M.D.  Emergency Medicine  [facility] EMERGENCY ROOM  [address] [address]  [phone]  Dept: [phone]       Star Huddleston MD  01/29/25 4724

## 2025-01-29 NOTE — CONSULTS
RENAL CONSULT NOTE    REQUESTING PHYSICIAN: Ke Huddleston MD    REASON FOR CONSULT: JOHNNY/Hyperkalemia    ASSESSMENT/PLAN:    PLAN:   -Continue expectant management (Avoid nephrotoxins, renal dose medications, avoid hyper/Hypotension, daily wt, daily I/O).   -HOLD PTA ACE/Lisinopril  -Avoid NSAIDS/Meloxicam HELD  -S/P Calcium gluconate/Dextrose, shifting in ED  -Give lokelma 10g x3 daily, on tele  -serial K checks Q 2 hr  -Renal diet, if not NPO  -Increase Flomax to 0.8 Q   -Add PRN Hydralazine  -Po4, albumin, UPCR, pending  -BMP daily    Non-oliguric JOHNNY: Cr eliana to 2.1 today. JOHNNY multifactorial ?Dehydration/ATN/HCTS/Poor intake, +/- Strong NSAID use (ibuprofen 2 tabs daily for wks, + Meloxicam for the last yr, Celebrex for several years prior to that for OA/RA) with on ACE therapy (started ~ 1 yr go)  PTA, +/- some mild CKD at BL (BL CR ~ 1.1-1.2 since 5873-7707, Cr 1.5 12/26/24, Cr 1.6 1/28/24, which prompted ED referral) Vs Progression while on these meds for OA/RA.  Other risk factors include BPH/Reports difficulty emptying bladder/multiple voids,  +/- Stone burden Hx. U A with Hyaline casts 16, + mild protein. Renal US, pending. 2/2019 CT ABD IMPRESSION: No urinary stones. Foci of chronic parenchymal scarring mid and upper left kidney. Small benign right renal cyst. KUB otherwise negative. Renal US 1/29/25 No Hydro/Small renal cyst, Left 5 mm Non-obstructing stone, or layer of mild Ca+ within a cyst, with similar appearing pole structure. R 10.1, L 13.7.   -Discussed risk of chronic NSAID use, encouraged him to stop Meloxicam/Celebrex if able to tolerate.     Hyperkalemia: 2/2 JOHNNY, +/-Strong NSAID/Meloxicam use PTA. Shifted in ED. Ordered PO lokelma, renal diet. On Tele. Serial K checks. Eats oranges frequently/?dietary.     acid/base: WNL, follow    CKD-MBD: Ca+ and mg WNL, follow    HTN: PTA Atenolol 100 mg daily, triamterene-hydrochlorothiazide, Lisinopril 40 mg daily (HELD with JOHNNY), and Flomax 0.4  "daily.Resume Hypertensive meds.     BPH: on Tamsulosin. PSA 4.6 last    HLD: on ASA, statin    H/O Nephroliths: Pt report no Nephroliths since 2021. Tells be it was  a \"Calcium stone\". Stone analysis in 2018 showed 100% Calcium Oxylate monohydrate stones. Urine Ph here was 6.0 Pt encouraged to Hydrate. On hydrochlorothiazide PTA.     RA/OA: on Celebrex for yrs, then switched to Meloxicam over the last yr on zuly, Was taking ibuprofen 2 tabs daily for wks pta. Had 10/2024 planter fascitis recently, now better. HAs follow with Rheumatology in the past.       HPI: Maxi is a 74 YOM with a PMH of HTN, HLD, Nephrolithiasis, plantar fascitis 10/2024, RA, OA. Who present to ED for abnormal external labs. Renal consulted for hyperkalemia/JOHNNY.   Has been using NSAIDs strongly over last few wks, + Meloxicam for the last yr, was on Celebrex for yrs prior.   Pt resting in bed, daughter at bedside  Reports overall feeling well with no issues PTA  Was told to come in because JOHNNY/Abn labs.   Strong NSAID nidia PTA, + meloxicam, + Lisinopril  Chronic poor oral fluid intake  Reports voiding 5-6 times in AM prior to feeling bladder empty, on tamsulosin, BPH noted on imaging, PSA 4.6  Denies UIT, gout or recent stone. Feels last stone was ~2021, was calcium oxylate per analysis in 2018. Not on any medications,   Denies n, v, c, d, sob, HA, fever, rash, feeling dizzy or CP  On tele. K shifted in ED, now on lokelma, renal diet if resuming eating.   Erial Ks improving 6.6>5.7  Report eating oranges PTA, ?dietary intake.         [facility]  Component 01/28/2025 12/16/2024 12/13/2023 12/13/2022          SODIUM 140 140 143 141   POTASSIUM 6.9 High Panic      6.1 High     5 4   CHLORIDE 110 109 107 105   CO2,TOTAL -- -- 27 25   ANION GAP -- -- 9 11   GLUCOSE 93 99 107 High     105 High       CALCIUM 9.9 9.9 10 10.3   BUN -- -- 30 High     20   CREATININE 1.68 High     1.57 High     " 1.12 0.91   BUN/CREAT RATIO           -- -- -- 22 High       eGFR -- -- 70 Low      90 Low        BUN/CREAT RATIO -- -- 27 High     --   UREA NITROGEN (BUN) 45 High     40 High     -- --   EGFR 43 Low     46 Low     -- --   BUN/CREATININE RATIO 27 High     25 High     -- --   CARBON DIOXIDE 23 27 -- --   ELECTROLYTE BALANCE 7 4 Low     --      Reviewed imaging:   Narrative & Impression   EXAM: US RENAL COMPLETE NON-VASCULAR  LOCATION: [facility]  DATE: 1/29/2025     INDICATION: Acute kidney injury, hyperkalemia, asymptomatic  COMPARISON: CT 1/31/2022  TECHNIQUE: Routine Bilateral Renal and Bladder Ultrasound.     FINDINGS:     RIGHT KIDNEY: 10.1 x 6 x 5.4 cm. No hydronephrosis. Medial renal 1.9 x 1.6 x 1.5 cm simple cyst which does not require further evaluation.      LEFT KIDNEY: 13.7 x 5.9 x 4.3 cm. No hydronephrosis. Upper pole 5 mm stone, or layering milk of calcium within a cyst. Similar appearing lower pole structure.     BLADDER: Normal.                                                                      IMPRESSION:  1.  No hydronephrosis.  2.  Possible left renal nonobstructive stones, or layering milk of calcium within small cysts.     REVIEW OF SYSTEMS:  Complete 12 point review of systems was negative other than those noted in the HPI      History reviewed. No pertinent past medical history.    Current Facility-Administered Medications   Medication Dose Route Frequency Provider Last Rate Last Admin    dextrose 10% BOLUS 300 mL  300 mL Intravenous Once Star Huddleston MD 75 mL/hr at 01/29/25 0859 300 mL at 01/29/25 0859    glucose gel 15-30 g  15-30 g Oral Q15 Min PRN Star Huddleston MD        Or    dextrose 50 % injection 25-50 mL  25-50 mL Intravenous Q15 Min PRN Star Huddleston MD        Or    glucagon injection 1 mg  1 mg Subcutaneous Q15 Min PRN Star Huddleston MD        sodium zirconium cyclosilicate (LOKELMA) packet 10 g  10 g Oral TID Star Huddleston MD          Current Outpatient Medications   Medication Sig Dispense Refill    acetaminophen (TYLENOL) 500 MG tablet Take 500-1,000 mg by mouth every 6 hours as needed for mild pain.      APPLE CIDER VINEGAR PO Take 1 tablet by mouth daily.      atenolol (TENORMIN) 100 MG tablet Take 100 mg by mouth daily.      atorvastatin (LIPITOR) 20 MG tablet Take 20 mg by mouth daily.      fish oil-omega-3 fatty acids 500 MG capsule Take 1,000 mg by mouth daily.      gabapentin (NEURONTIN) 300 MG capsule Take 300 mg by mouth 3 times daily.      lactobacillus rhamnosus, GG, (CULTURELL) capsule Take 1 capsule by mouth daily.      lisinopril (ZESTRIL) 40 MG tablet Take 40 mg by mouth daily.      meloxicam (MOBIC) 15 MG tablet Take 15 mg by mouth daily.      tamsulosin (FLOMAX) 0.4 mg cap [TAMSULOSIN (FLOMAX) 0.4 MG CAP] Take 1 capsule (0.4 mg total) by mouth daily. 90 capsule 3    triamterene-HCTZ (DYAZIDE) 37.5-25 MG capsule Take 1 capsule by mouth every morning.         acetaminophen (TYLENOL) 500 MG tablet  APPLE CIDER VINEGAR PO  atenolol (TENORMIN) 100 MG tablet  atorvastatin (LIPITOR) 20 MG tablet  fish oil-omega-3 fatty acids 500 MG capsule  gabapentin (NEURONTIN) 300 MG capsule  lactobacillus rhamnosus, GG, (CULTURELL) capsule  lisinopril (ZESTRIL) 40 MG tablet  meloxicam (MOBIC) 15 MG tablet  tamsulosin (FLOMAX) 0.4 mg cap  triamterene-HCTZ (DYAZIDE) 37.5-25 MG capsule        ALLERGIES/SENSITIVITIES:  Allergies   Allergen Reactions    Levaquin [Levofloxacin] Itching     Social History     Tobacco Use    Smoking status: Never    Smokeless tobacco: Never   Substance Use Topics    Alcohol use: No    Drug use: No     I have reviewed this patient's family history and updated it with pertinent information if needed.  Family History   Problem Relation Age of Onset    Cancer Mother         breast    Diabetes Father     Arthritis Father     Urolithiasis Brother          PHYSICAL EXAM:  Physical Exam   Temp: 97.3  F (36.3  C) Temp src:  Temporal BP: (!) 155/71 Pulse: 56   Resp: 12 SpO2: 99 % O2 Device: None (Room air)    Vitals:    01/29/25 0721   Weight: 83.2 kg (183 lb 8 oz)     Vital Signs with Ranges  Temp:  [97.3  F (36.3  C)] 97.3  F (36.3  C)  Pulse:  [44-59] 56  Resp:  [12-20] 12  BP: (155-183)/() 155/71  SpO2:  [98 %-100 %] 99 %  No intake/output data recorded.        Patient Vitals for the past 72 hrs:   Weight   01/29/25 0721 83.2 kg (183 lb 8 oz)     GEN: NAD, aox3, good historian  CV: RRR , no JVD  Lung: clear and equal, on RA  Ab: soft and NT  Ext: no edema and well perfused  Skin: no rash  Neuro: no aterixsis  Psych: Cooperative  : no cook      Laboratory:     Recent Labs   Lab 01/29/25  0832   WBC 7.1   RBC 4.24*   HGB 13.3   HCT 40.5          Basic Metabolic Panel:  Recent Labs   Lab 01/29/25  0931 01/29/25  0842 01/29/25  0735   NA  --   --  139   POTASSIUM  --   --  6.6*   CHLORIDE  --   --  110*   CO2  --   --  22   BUN  --   --  49.2*   CR  --   --  2.12*   * 97 116*   SPARKLE  --   --  10.0       INRNo lab results found in last 7 days.    Recent Labs   Lab Test 01/29/25  0735 11/25/21  1949   POTASSIUM 6.6* 4.3   CHLORIDE 110* 103   BUN 49.2* 25      Recent Labs   Lab Test 01/29/25  0829 11/25/21  2043 11/17/21  0130 11/17/21  0123   ALBUMIN  --   --   --  4.0   BILITOTAL  --   --   --  0.5   ALT  --   --   --  18   AST  --   --   --  20   PROTEIN 10* Negative   < >  --     < > = values in this interval not displayed.       Personally reviewed today's laboratory studies      Thank you for involving us in the care of this patient. We will continue to follow along with you.      [facility]  [facility]  [phone]

## 2025-01-30 VITALS
BODY MASS INDEX: 26.27 KG/M2 | SYSTOLIC BLOOD PRESSURE: 147 MMHG | OXYGEN SATURATION: 98 % | TEMPERATURE: 97.7 F | DIASTOLIC BLOOD PRESSURE: 68 MMHG | WEIGHT: 183.5 LBS | HEIGHT: 70 IN | RESPIRATION RATE: 16 BRPM | HEART RATE: 63 BPM

## 2025-01-30 LAB
ANION GAP SERPL CALCULATED.3IONS-SCNC: 10 MMOL/L (ref 7–15)
BUN SERPL-MCNC: 37.8 MG/DL (ref 8–23)
CALCIUM SERPL-MCNC: 9.3 MG/DL (ref 8.8–10.4)
CHLORIDE SERPL-SCNC: 107 MMOL/L (ref 98–107)
CREAT SERPL-MCNC: 1.76 MG/DL (ref 0.67–1.17)
EGFRCR SERPLBLD CKD-EPI 2021: 40 ML/MIN/1.73M2
FERRITIN SERPL-MCNC: 131 NG/ML (ref 31–409)
GLUCOSE BLDC GLUCOMTR-MCNC: 106 MG/DL (ref 70–99)
GLUCOSE BLDC GLUCOMTR-MCNC: 109 MG/DL (ref 70–99)
GLUCOSE BLDC GLUCOMTR-MCNC: 135 MG/DL (ref 70–99)
GLUCOSE SERPL-MCNC: 105 MG/DL (ref 70–99)
HCO3 SERPL-SCNC: 23 MMOL/L (ref 22–29)
IRON BINDING CAPACITY (ROCHE): 227 UG/DL (ref 240–430)
IRON SATN MFR SERPL: 30 % (ref 15–46)
IRON SERPL-MCNC: 67 UG/DL (ref 61–157)
POTASSIUM SERPL-SCNC: 4.4 MMOL/L (ref 3.4–5.3)
POTASSIUM SERPL-SCNC: 4.9 MMOL/L (ref 3.4–5.3)
POTASSIUM SERPL-SCNC: 4.9 MMOL/L (ref 3.4–5.3)
PROT PATTERN UR ELPH-IMP: NORMAL
PTH-INTACT SERPL-MCNC: 49 PG/ML (ref 15–65)
SODIUM SERPL-SCNC: 140 MMOL/L (ref 135–145)
TOTAL PROTEIN SERUM FOR ELP: 5.4 G/DL (ref 6.4–8.3)
VIT D+METAB SERPL-MCNC: 22 NG/ML (ref 20–50)

## 2025-01-30 PROCEDURE — 84165 PROTEIN E-PHORESIS SERUM: CPT | Mod: TC | Performed by: PATHOLOGY

## 2025-01-30 PROCEDURE — 36415 COLL VENOUS BLD VENIPUNCTURE: CPT | Performed by: INTERNAL MEDICINE

## 2025-01-30 PROCEDURE — 250N000012 HC RX MED GY IP 250 OP 636 PS 637: Performed by: HOSPITALIST

## 2025-01-30 PROCEDURE — 83970 ASSAY OF PARATHORMONE: CPT | Performed by: INTERNAL MEDICINE

## 2025-01-30 PROCEDURE — 258N000003 HC RX IP 258 OP 636: Performed by: HOSPITALIST

## 2025-01-30 PROCEDURE — 84132 ASSAY OF SERUM POTASSIUM: CPT | Performed by: HOSPITALIST

## 2025-01-30 PROCEDURE — 250N000013 HC RX MED GY IP 250 OP 250 PS 637

## 2025-01-30 PROCEDURE — 80048 BASIC METABOLIC PNL TOTAL CA: CPT | Performed by: HOSPITALIST

## 2025-01-30 PROCEDURE — 99207 PR APP CREDIT; MD BILLING SHARED VISIT: CPT

## 2025-01-30 PROCEDURE — 82728 ASSAY OF FERRITIN: CPT

## 2025-01-30 PROCEDURE — 83550 IRON BINDING TEST: CPT

## 2025-01-30 PROCEDURE — 250N000013 HC RX MED GY IP 250 OP 250 PS 637: Performed by: HOSPITALIST

## 2025-01-30 PROCEDURE — 99233 SBSQ HOSP IP/OBS HIGH 50: CPT | Mod: FS | Performed by: INTERNAL MEDICINE

## 2025-01-30 PROCEDURE — 84166 PROTEIN E-PHORESIS/URINE/CSF: CPT | Mod: 26 | Performed by: PATHOLOGY

## 2025-01-30 PROCEDURE — 82962 GLUCOSE BLOOD TEST: CPT

## 2025-01-30 PROCEDURE — 36415 COLL VENOUS BLD VENIPUNCTURE: CPT | Performed by: HOSPITALIST

## 2025-01-30 PROCEDURE — 83540 ASSAY OF IRON: CPT

## 2025-01-30 PROCEDURE — 84155 ASSAY OF PROTEIN SERUM: CPT | Performed by: INTERNAL MEDICINE

## 2025-01-30 RX ORDER — HYDROCHLOROTHIAZIDE 25 MG/1
25 TABLET ORAL DAILY
Qty: 30 TABLET | Refills: 1 | Status: SHIPPED | OUTPATIENT
Start: 2025-01-31

## 2025-01-30 RX ORDER — LISINOPRIL 40 MG/1
20 TABLET ORAL DAILY
Status: SHIPPED
Start: 2025-01-30 | End: 2025-01-30

## 2025-01-30 RX ORDER — PREDNISONE 20 MG/1
20 TABLET ORAL DAILY
Qty: 5 TABLET | Refills: 0 | Status: SHIPPED | OUTPATIENT
Start: 2025-01-30 | End: 2025-02-04

## 2025-01-30 RX ORDER — ATENOLOL 100 MG/1
50 TABLET ORAL DAILY
Status: SHIPPED
Start: 2025-01-30

## 2025-01-30 RX ADMIN — TAMSULOSIN HYDROCHLORIDE 0.8 MG: 0.4 CAPSULE ORAL at 08:29

## 2025-01-30 RX ADMIN — SODIUM CHLORIDE: 9 INJECTION, SOLUTION INTRAVENOUS at 05:39

## 2025-01-30 RX ADMIN — HYDROCHLOROTHIAZIDE 25 MG: 25 TABLET ORAL at 10:15

## 2025-01-30 RX ADMIN — PREDNISONE 20 MG: 20 TABLET ORAL at 08:29

## 2025-01-30 RX ADMIN — ATENOLOL 50 MG: 25 TABLET ORAL at 10:15

## 2025-01-30 RX ADMIN — GABAPENTIN 100 MG: 100 CAPSULE ORAL at 08:29

## 2025-01-30 RX ADMIN — ATORVASTATIN CALCIUM 20 MG: 10 TABLET, FILM COATED ORAL at 08:29

## 2025-01-30 RX ADMIN — SENNOSIDES AND DOCUSATE SODIUM 1 TABLET: 50; 8.6 TABLET ORAL at 08:36

## 2025-01-30 ASSESSMENT — ACTIVITIES OF DAILY LIVING (ADL)
ADLS_ACUITY_SCORE: 41

## 2025-01-30 NOTE — PROGRESS NOTES
Pt discharged to home. Discharge instructions provided to the patient. All questions and concerns answered. All patient belongings left with patient.

## 2025-01-30 NOTE — SIGNIFICANT EVENT
Significant Event Note    Time of event: 7:05 PM January 29, 2025    Description of event:  Notified of recurrence of hyperkalemia despite earlier interventions.    -albuterol  -insulin with dextrose repletion  -Lasix 40mg IV ONCE  -actively on lokelma 10g PO TID, next due at 8pm (1hr from now)  -nephrology notified, to reconsider emergent HD as they deem necessary        Emir Babb MD

## 2025-01-30 NOTE — PROGRESS NOTES
RENAL PROGRESS NOTE    HPI: Maxi is a 74 YOM with a PMH of HTN, HLD, Nephrolithiasis, plantar fascitis 10/2024, RA, OA. Who present to ED for abnormal external labs. Renal consulted for hyperkalemia/JOHNNY.     ASSESSMENT & PLAN:     PLAN:   -Continue expectant management (Avoid nephrotoxins, renal dose medications, avoid hyper/Hypotension, daily wt, daily I/O).   -HOLD IVFs  -HOLD Lokelma  -HOLD PTA ACE/Lisinopril, no objection to resuming ACE therapy at any point if needing further HTN medications.   -Renal diet while here,no diet restriction upon discharge.   -Avoid NSAIDS/Meloxicam HELD. Discussed risk of chronic NSAID use with pt, encouraged him to stop Meloxicam/Celebrex if able to tolerate. Pt will connect with rheumatology in out pt setting for RA/OA management.   -SPEP, UPEP, iron panel, pending  -He has an OP Nephrology follow up appointment with Thurs 2/27/25 with Aide Harris PA-C at 1 PM.    -No objection to discharge when medically cleared, repeat labs one week at PCP, renal f/unit(s) 2/27.   -BMP daily     Non-oliguric JOHNNY: Cr eliana to 2.1>1.9>1.7 today. JOHNNY multifactorial ?Dehydration/ATN/HCTS/Poor intake, +/- Strong NSAID use (ibuprofen 2 tabs daily for wks, + Meloxicam for the last yr, Celebrex for several years prior to that for OA/RA) with on ACE therapy (started ~ 1 yr go)  PTA, +/- some mild CKD at BL (BL CR ~ 1.1-1.2 since 5172-4982, Cr 1.5 12/26/24, Cr 1.6 1/28/24, which prompted ED referral) Vs Progression while on these meds for OA/RA.  Other risk factors include BPH/Reports difficulty emptying bladder/multiple voids,  +/- Stone burden Hx. U A with Hyaline casts 16, + mild protein (UPCR 0.08 mg/mg). 2/2019 CT ABD IMPRESSION: No urinary stones. Foci of chronic parenchymal scarring mid and upper left kidney. Small benign right renal cyst. KUB otherwise negative. Renal US 1/29/25 No Hydro/Small renal cyst, Left 5 mm Non-obstructing stone, or layer of mild Ca+ within a cyst, with similar  "appearing pole structure. R 10.1, L 13.7.        Hyperkalemia: 2/2 JOHNNY, +/-Strong NSAID/Meloxicam use PTA. Shifted in ED. Ordered PO lokelma, renal diet. On Tele. Serial K checks. Eats oranges frequently/?dietary. RESOLVED.     acid/base: WNL, follow     CKD-MBD: Ca+ and mg WNL, follow. Po4 3.9, wNL. it D 22. PTH, pending    Normocytic anemia: ?dilutional s/p IVFs     HTN: On atenolol 50 mg every day, hydrochlorothiazide 25 mg daily,  Flomax 0.8mg daily. HELD PTA lisinopril. Also, on zuly daily.      BPH: on Tamsulosin. PSA 4.6 last     HLD: on ASA, statin     H/O Nephroliths: Pt report no Nephroliths since 2021. Tells be it was  a \"Calcium stone\". Stone analysis in 2018 showed 100% Calcium Oxylate monohydrate stones. Urine Ph here was 6.0, stable. Pt encouraged to Hydrate. On hydrochlorothiazide PTA.      RA/OA: on Celebrex for yrs, then switched to Meloxicam over the last yr on zuly, Was taking ibuprofen 2 tabs daily for wks pta. Had 10/2024 planter fascitis recently, now better. HAs follow with Rheumatology in the past.    SUBJECTIVE:    Pt resting in bed, no concerns  Daughter at bedside  Mild HTN prior to morning medications, on RA  Lytes, acid/base stable, Renal function improving 2/0>1.7  Denies n, v, c, d, sob, fever, rash, HA, feeling dizzy or CP  No objection to discharge when medically cleared, labs 1 WK, renal follow up 2/27.   Appears euvolemic. No edema  Good UO  Discussed/labs/Plan and answered all questions.     OBJECTIVE:  Physical Exam   Temp: 97.7  F (36.5  C) Temp src: Oral BP: (!) 147/68 Pulse: 63   Resp: 16 SpO2: 98 % O2 Device: None (Room air)    Vitals:    01/29/25 0721   Weight: 83.2 kg (183 lb 8 oz)     Vital Signs with Ranges  Temp:  [97.3  F (36.3  C)-98.4  F (36.9  C)] 97.7  F (36.5  C)  Pulse:  [] 63  Resp:  [11-45] 16  BP: (119-173)/(57-80) 147/68  SpO2:  [96 %-100 %] 98 %  I/O last 3 completed shifts:  In: 3343.33 [P.O.:1170; I.V.:2173.33]  Out: 2125 [Urine:2125]        Patient " Vitals for the past 72 hrs:   Weight   01/29/25 0721 83.2 kg (183 lb 8 oz)     Intake/Output Summary (Last 24 hours) at 1/30/2025 0919  Last data filed at 1/30/2025 0803  Gross per 24 hour   Intake 3583.33 ml   Output 2125 ml   Net 1458.33 ml       PHYSICAL EXAM:  GEN: NAD, aox3, good historian  CV: RRR , no JVD  Lung: clear and equal, on RA  Ab: soft and NT  Ext: no edema and well perfused  Skin: no rash  Neuro: no aterixsis  Psych: Cooperative  : no cook    LABORATORY STUDIES:     Recent Labs   Lab 01/29/25  2203 01/29/25  0832   WBC 7.9 7.1   RBC 3.77* 4.24*   HGB 11.7* 13.3   HCT 35.4* 40.5    225       Basic Metabolic Panel:  Recent Labs   Lab 01/30/25  0621 01/30/25  0202 01/30/25  0201 01/30/25  0059 01/30/25  0001 01/29/25  2258 01/29/25  2203 01/29/25  1947 01/29/25  1809 01/29/25  1448 01/29/25  1348 01/29/25  1101 01/29/25  1043 01/29/25  0842 01/29/25  0735     --   --   --   --   --  139  --  137  --   --   --   --   --  139   POTASSIUM 4.4 4.9  --   --   --   --  4.6  --  6.5*  6.5*  --  5.7*  --  5.7*  --  6.6*   CHLORIDE 107  --   --   --   --   --  107  --  109*  --   --   --   --   --  110*   CO2 23  --   --   --   --   --  20*  --  18*  --   --   --   --   --  22   BUN 37.8*  --   --   --   --   --  42.6*  --  42.2*  --   --   --   --   --  49.2*   CR 1.76*  --   --   --   --   --  1.93*  --  1.83*  --   --   --   --   --  2.12*   *  --  106* 109* 135* 168* 209*   < > 167*   < >  --    < >  --    < > 116*   SPARKLE 9.3  --   --   --   --   --  9.4  --  9.5  --   --   --   --   --  10.0    < > = values in this interval not displayed.       INRNo lab results found in last 7 days.     Recent Labs   Lab Test 01/29/25 2203 01/29/25  0832   WBC 7.9 7.1   HGB 11.7* 13.3    225       Personally reviewed current labs    [facility]-BC  [facility]  [phone]

## 2025-01-30 NOTE — PROGRESS NOTES
Nephrology Cross Cover Note:   Received page by cross cover hospitalist regarding potassium 6.5.   Patient has been receiving Lokelma 10 g every 8 hours and the third dose is due at 8 PM.  Per RN, he had 2 episodes of urine output but unknown volume.  He had small bowel movement but unknown color.  His most recent blood pressure 154/66.  Per RN report, he is clinically stable and no complaint and denies chest pain, shortness of breath.  Full chemistry was done at 7:35 this morning and only potassium has been monitored every 4 hours.    Recommendation:  Check full BMP now to see if any other reason that potassium is elevated.  Gave 8 PM Lokelma now and q 8 hr after that.  Agree with furosemide 40 mg x 1 now.  Recheck potassium at 10 PM for further intervention.  Depending on his response to diuretics and Lokelma, he might need dialysis tomorrow.  Strict Is and Os  Change to renal diet.   If he is making good amount of urine, we can increase the rate of IVF with schedule loop diuretic q 6 hrs for kaliuresis.   Recheck CBC to see dropping in hb from hemolysis/GIB.   Ok to continue to check K q 4hr.   Call nephrology for additional question or concern.    Raymon Chambers MD   [facility]     Addendum:   Cr improving at 6: 09 pm.   Bicarb low and chloride stable.   Check VBG at 10 pm and if acidosis, will change to Bicarb infusion to help intracellular K shift.     Raymon Chambers MD

## 2025-01-30 NOTE — PLAN OF CARE
Problem: Adult Inpatient Plan of Care  Goal: Readiness for Transition of Care  Outcome: Progressing     Problem: Electrolyte Imbalance  Goal: Electrolyte Balance  Outcome: Progressing   Goal Outcome Evaluation:  Patient A&O x4, able to make needs known. VSS on RA. NSR on telemetry. Denied pain throughout shift. Right PIV infusing NS at 100mL/hr. Denied SOB, BRIGGS, and chest pain. Lung sounds clear. +2 edema of LUE due to IV infiltrating. Voiding spontaneously. No BM during shift. Q4 potassium checks. Renal diet, tolerating well. Strict I&Os. Independent with activity. Discharge pending.

## 2025-01-31 LAB
ALBUMIN SERPL ELPH-MCNC: 3.4 G/DL (ref 3.7–5.1)
ALPHA1 GLOB SERPL ELPH-MCNC: 0.3 G/DL (ref 0.2–0.4)
ALPHA2 GLOB SERPL ELPH-MCNC: 0.5 G/DL (ref 0.5–0.9)
B-GLOBULIN SERPL ELPH-MCNC: 0.6 G/DL (ref 0.6–1)
GAMMA GLOB SERPL ELPH-MCNC: 0.6 G/DL (ref 0.7–1.6)
M PROTEIN SERPL ELPH-MCNC: 0 G/DL
PROT PATTERN SERPL ELPH-IMP: ABNORMAL

## 2025-01-31 PROCEDURE — 84165 PROTEIN E-PHORESIS SERUM: CPT | Mod: 26 | Performed by: PATHOLOGY
